# Patient Record
Sex: FEMALE | Race: WHITE | NOT HISPANIC OR LATINO | Employment: FULL TIME | URBAN - METROPOLITAN AREA
[De-identification: names, ages, dates, MRNs, and addresses within clinical notes are randomized per-mention and may not be internally consistent; named-entity substitution may affect disease eponyms.]

---

## 2020-07-17 ENCOUNTER — TRANSFERRED RECORDS (OUTPATIENT)
Dept: HEALTH INFORMATION MANAGEMENT | Facility: CLINIC | Age: 25
End: 2020-07-17

## 2020-08-03 ENCOUNTER — TRANSFERRED RECORDS (OUTPATIENT)
Dept: HEALTH INFORMATION MANAGEMENT | Facility: CLINIC | Age: 25
End: 2020-08-03

## 2020-08-07 ENCOUNTER — TRANSFERRED RECORDS (OUTPATIENT)
Dept: HEALTH INFORMATION MANAGEMENT | Facility: CLINIC | Age: 25
End: 2020-08-07

## 2020-09-30 ENCOUNTER — TRANSFERRED RECORDS (OUTPATIENT)
Dept: HEALTH INFORMATION MANAGEMENT | Facility: CLINIC | Age: 25
End: 2020-09-30

## 2020-10-01 ENCOUNTER — TRANSFERRED RECORDS (OUTPATIENT)
Dept: HEALTH INFORMATION MANAGEMENT | Facility: CLINIC | Age: 25
End: 2020-10-01

## 2020-10-14 ENCOUNTER — TRANSFERRED RECORDS (OUTPATIENT)
Dept: HEALTH INFORMATION MANAGEMENT | Facility: CLINIC | Age: 25
End: 2020-10-14

## 2020-10-14 ENCOUNTER — MEDICAL CORRESPONDENCE (OUTPATIENT)
Dept: HEALTH INFORMATION MANAGEMENT | Facility: CLINIC | Age: 25
End: 2020-10-14

## 2020-10-15 ENCOUNTER — TELEPHONE (OUTPATIENT)
Dept: OBGYN | Facility: CLINIC | Age: 25
End: 2020-10-15

## 2020-10-15 ENCOUNTER — MEDICAL CORRESPONDENCE (OUTPATIENT)
Dept: HEALTH INFORMATION MANAGEMENT | Facility: CLINIC | Age: 25
End: 2020-10-15

## 2020-10-15 ENCOUNTER — TELEPHONE (OUTPATIENT)
Dept: MATERNAL FETAL MEDICINE | Facility: CLINIC | Age: 25
End: 2020-10-15

## 2020-10-15 DIAGNOSIS — Z13.9 SCREENING FOR CONDITION: Primary | ICD-10-CM

## 2020-10-15 RX ORDER — MIFEPRISTONE 200 MG/1
200 TABLET ORAL ONCE
Status: DISCONTINUED | OUTPATIENT
Start: 2020-10-15 | End: 2020-10-18

## 2020-10-15 NOTE — TELEPHONE ENCOUNTER
ANDRESM called as they received a call from [place] for a induction of labor and pt is reported 22 2/7 weeks pregnant    There has been no 24 hour consent obtained    Email sent to Alexandria Mayes to confirm insurance coverage    Discussed with Dr. Machado who will review paperwork, get 24 hour consent and return to nursing

## 2020-10-15 NOTE — TELEPHONE ENCOUNTER
Requesting letter for off work after Induction of Labor.  Letter done and emailed to Leigh.  Noted off work until 10/26/2020.  Pt informed.  Also discussed what time appt tomorrow in clinic and then covid and scheduling for induction on Saturday

## 2020-10-15 NOTE — LETTER
October 15, 2020    Regarding: Leigh Roberts  YOB: 1995  [address] [address]    To Whom It May Concern:     Leigh is under the care of [facility] and is unable to return to work until October 26, 2020     If you have additional questions or concerns, please call us at [phone].    Sincerely,        Justine Gonzalez MD

## 2020-10-15 NOTE — TELEPHONE ENCOUNTER
Spoke with pt and instructed below and called back and left message on voice mail per pt request with     Confirmed induction time/date/location with patient. Informed of clinic appointment date/time/location for mifepristone administration. Informed pt to call birth place one hour prior to scheduled induction [phone]. OK to eat morning of induction.     Pt informed that COVID test will be completed 1-2 hours prior to her induction in the [facility], to arrive around 8 am and then will need to await results     Gestational Age on date of Induction: 22 4/7        CHECKLIST    Medical records received and reviewed by MD: HIEU    Insurance Verified (confirm if completed by [facility], otherwise email [email or web address]): Y    24 hour consent completed by provider or referring clinic, scanned to chart: Y  [id number]/15/[id number]    COVID test ordered/scheduled [facility] 2 hours prior to induction     RN nurse visit for mifepristone day prior to induction: Yes  2 pm 10/16/2020     Scheduled with birthplace - [phone]: Yes    Date/Time Clinic RN Visit: 10/16/2020 2 pm    Date/Time Induction: 10/17/2020 10 am/Covid testing 8 am      Comments:

## 2020-10-15 NOTE — TELEPHONE ENCOUNTER
Pt calling requesting a letter stating she will be admitted to the hospital for IOL on Saturday 10/17/2020 and requesting ideally a week off of work to follow to recover. Message will be passed to Nicolette WINSLOW at [facility] to request letter from supervising MD. Pt stated she wishes letter be sent to [email or web address].

## 2020-10-15 NOTE — TELEPHONE ENCOUNTER
Alexandria Mayes  Thu 10/15/2020 9:53 AM  ?  ?    ?    To: Nicolette Phuong    Hi Nicolette     MRN [id number]  Leigh Roberts     Patient has Blue Plus through [facility]. I spoke with the insurance and a D&E is a covered service, no Auth is needed and no exlusions. Reference # [id number]     Alexandria Mayes

## 2020-10-16 ENCOUNTER — ALLIED HEALTH/NURSE VISIT (OUTPATIENT)
Dept: OBGYN | Facility: CLINIC | Age: 25
End: 2020-10-16
Attending: OBSTETRICS & GYNECOLOGY
Payer: MEDICAID

## 2020-10-16 DIAGNOSIS — Z33.2 TERMINATION OF PREGNANCY (FETUS): Primary | ICD-10-CM

## 2020-10-16 PROCEDURE — 250N000013 HC RX MED GY IP 250 OP 250 PS 637: Performed by: ADVANCED PRACTICE MIDWIFE

## 2020-10-16 PROCEDURE — 99207 PR NO CHARGE NURSE ONLY: CPT

## 2020-10-16 PROCEDURE — 250N000013 HC RX MED GY IP 250 OP 250 PS 637: Performed by: OBSTETRICS & GYNECOLOGY

## 2020-10-16 RX ORDER — MIFEPRISTONE 200 MG/1
200 TABLET ORAL ONCE
Status: DISCONTINUED | OUTPATIENT
Start: 2020-10-16 | End: 2020-10-16

## 2020-10-16 RX ADMIN — Medication 200 MG: at 16:12

## 2020-10-16 RX ADMIN — Medication 200 MG: at 11:51

## 2020-10-16 NOTE — TELEPHONE ENCOUNTER
To: Alexandria Mayes    Just for clarification, this is an induction of labor and not a D&E, does this require any additional clarification with insurance?  Thanks!  Nicolette    Label: DeleteExchangeContent (3 years) Expires: Mon 10/16/2023 10:59 AM  AA  ?  Alexandria Callejas 10/16/2020 10:59 AM  To: Nicolette Hernandez I miss read the email. Yes this would be a covered service as well     Alexandria

## 2020-10-16 NOTE — TELEPHONE ENCOUNTER
Pt called and confirming covid testing will be done in the birth place? Yes.  She is only confirming as she has received a call from scheduling.  Instructed this will be done at the [facility], please disregard reminder for covid testing appt (the appt was cancelled yesterday).

## 2020-10-16 NOTE — NURSING NOTE
Spoke with pt after she met with Tamir Saini CNM.  She was accompanied by sister    She was given directions for 10/17/2020 to present to [facility] at 8 am for covid testing/ to follow with induction of labor    Will await results before moving forward    Many questions and will talk more with nurse tomorrow during induction and involve social work as needed.      Questions answered, pt is aware where Birth Place is located and to eat a light breakfast before coming in. Nicolette Baca RN

## 2020-10-17 ENCOUNTER — ANESTHESIA (OUTPATIENT)
Dept: OBGYN | Facility: CLINIC | Age: 25
End: 2020-10-17
Payer: COMMERCIAL

## 2020-10-17 ENCOUNTER — ANESTHESIA EVENT (OUTPATIENT)
Dept: OBGYN | Facility: CLINIC | Age: 25
End: 2020-10-17
Payer: COMMERCIAL

## 2020-10-17 ENCOUNTER — HOSPITAL ENCOUNTER (INPATIENT)
Facility: CLINIC | Age: 25
LOS: 1 days | Discharge: HOME | End: 2020-10-18
Attending: OBSTETRICS & GYNECOLOGY | Admitting: OBSTETRICS & GYNECOLOGY
Payer: COMMERCIAL

## 2020-10-17 DIAGNOSIS — O04.80 (INDUCED) TERMINATION OF PREGNANCY WITH UNSPECIFIED COMPLICATIONS: Primary | ICD-10-CM

## 2020-10-17 LAB
ABO + RH BLD: NORMAL
ABO + RH BLD: NORMAL
BLD GP AB SCN SERPL QL: NORMAL
BLOOD BANK CMNT PATIENT-IMP: NORMAL
ERYTHROCYTE [DISTWIDTH] IN BLOOD BY AUTOMATED COUNT: 13 % (ref 10–15)
HCT VFR BLD AUTO: 35.4 % (ref 35–47)
HGB BLD-MCNC: 12 G/DL (ref 11.7–15.7)
LABORATORY COMMENT REPORT: NORMAL
MCH RBC QN AUTO: 28.8 PG (ref 26.5–33)
MCHC RBC AUTO-ENTMCNC: 33.9 G/DL (ref 31.5–36.5)
MCV RBC AUTO: 85 FL (ref 78–100)
PLATELET # BLD AUTO: 196 10E9/L (ref 150–450)
RBC # BLD AUTO: 4.17 10E12/L (ref 3.8–5.2)
SARS-COV-2 RNA SPEC QL NAA+PROBE: NEGATIVE
SARS-COV-2 RNA SPEC QL NAA+PROBE: NORMAL
SPECIMEN EXP DATE BLD: NORMAL
SPECIMEN SOURCE: NORMAL
SPECIMEN SOURCE: NORMAL
WBC # BLD AUTO: 7.7 10E9/L (ref 4–11)

## 2020-10-17 PROCEDURE — 88342 IMHCHEM/IMCYTCHM 1ST ANTB: CPT | Mod: 26 | Performed by: PATHOLOGY

## 2020-10-17 PROCEDURE — 120N000002 HC R&B MED SURG/OB UMMC

## 2020-10-17 PROCEDURE — 88342 IMHCHEM/IMCYTCHM 1ST ANTB: CPT | Mod: TC | Performed by: STUDENT IN AN ORGANIZED HEALTH CARE EDUCATION/TRAINING PROGRAM

## 2020-10-17 PROCEDURE — 250N000009 HC RX 250: Performed by: ANESTHESIOLOGY

## 2020-10-17 PROCEDURE — 3E0R3BZ INTRODUCTION OF ANESTHETIC AGENT INTO SPINAL CANAL, PERCUTANEOUS APPROACH: ICD-10-PCS | Performed by: ANESTHESIOLOGY

## 2020-10-17 PROCEDURE — 250N000011 HC RX IP 250 OP 636

## 2020-10-17 PROCEDURE — 88305 TISSUE EXAM BY PATHOLOGIST: CPT | Mod: 26 | Performed by: PATHOLOGY

## 2020-10-17 PROCEDURE — 86900 BLOOD TYPING SEROLOGIC ABO: CPT | Performed by: STUDENT IN AN ORGANIZED HEALTH CARE EDUCATION/TRAINING PROGRAM

## 2020-10-17 PROCEDURE — U0003 INFECTIOUS AGENT DETECTION BY NUCLEIC ACID (DNA OR RNA); SEVERE ACUTE RESPIRATORY SYNDROME CORONAVIRUS 2 (SARS-COV-2) (CORONAVIRUS DISEASE [COVID-19]), AMPLIFIED PROBE TECHNIQUE, MAKING USE OF HIGH THROUGHPUT TECHNOLOGIES AS DESCRIBED BY CMS-2020-01-R: HCPCS | Performed by: STUDENT IN AN ORGANIZED HEALTH CARE EDUCATION/TRAINING PROGRAM

## 2020-10-17 PROCEDURE — 250N000011 HC RX IP 250 OP 636: Performed by: ANESTHESIOLOGY

## 2020-10-17 PROCEDURE — 86850 RBC ANTIBODY SCREEN: CPT | Performed by: STUDENT IN AN ORGANIZED HEALTH CARE EDUCATION/TRAINING PROGRAM

## 2020-10-17 PROCEDURE — 250N000013 HC RX MED GY IP 250 OP 250 PS 637: Performed by: STUDENT IN AN ORGANIZED HEALTH CARE EDUCATION/TRAINING PROGRAM

## 2020-10-17 PROCEDURE — 36415 COLL VENOUS BLD VENIPUNCTURE: CPT | Performed by: STUDENT IN AN ORGANIZED HEALTH CARE EDUCATION/TRAINING PROGRAM

## 2020-10-17 PROCEDURE — 250N000011 HC RX IP 250 OP 636: Performed by: STUDENT IN AN ORGANIZED HEALTH CARE EDUCATION/TRAINING PROGRAM

## 2020-10-17 PROCEDURE — C9803 HOPD COVID-19 SPEC COLLECT: HCPCS

## 2020-10-17 PROCEDURE — 85027 COMPLETE CBC AUTOMATED: CPT | Performed by: STUDENT IN AN ORGANIZED HEALTH CARE EDUCATION/TRAINING PROGRAM

## 2020-10-17 PROCEDURE — 258N000003 HC RX IP 258 OP 636

## 2020-10-17 PROCEDURE — 86780 TREPONEMA PALLIDUM: CPT | Performed by: STUDENT IN AN ORGANIZED HEALTH CARE EDUCATION/TRAINING PROGRAM

## 2020-10-17 PROCEDURE — 10907ZC DRAINAGE OF AMNIOTIC FLUID, THERAPEUTIC FROM PRODUCTS OF CONCEPTION, VIA NATURAL OR ARTIFICIAL OPENING: ICD-10-PCS | Performed by: OBSTETRICS & GYNECOLOGY

## 2020-10-17 PROCEDURE — 88305 TISSUE EXAM BY PATHOLOGIST: CPT | Mod: TC | Performed by: STUDENT IN AN ORGANIZED HEALTH CARE EDUCATION/TRAINING PROGRAM

## 2020-10-17 PROCEDURE — 59409 OBSTETRICAL CARE: CPT | Mod: GC | Performed by: OBSTETRICS & GYNECOLOGY

## 2020-10-17 PROCEDURE — 86901 BLOOD TYPING SEROLOGIC RH(D): CPT | Performed by: STUDENT IN AN ORGANIZED HEALTH CARE EDUCATION/TRAINING PROGRAM

## 2020-10-17 PROCEDURE — 00HU33Z INSERTION OF INFUSION DEVICE INTO SPINAL CANAL, PERCUTANEOUS APPROACH: ICD-10-PCS | Performed by: ANESTHESIOLOGY

## 2020-10-17 PROCEDURE — 3E0P7VZ INTRODUCTION OF HORMONE INTO FEMALE REPRODUCTIVE, VIA NATURAL OR ARTIFICIAL OPENING: ICD-10-PCS | Performed by: OBSTETRICS & GYNECOLOGY

## 2020-10-17 PROCEDURE — 722N000001 HC LABOR CARE VAGINAL DELIVERY SINGLE

## 2020-10-17 RX ORDER — ONDANSETRON 2 MG/ML
4 INJECTION INTRAMUSCULAR; INTRAVENOUS EVERY 6 HOURS PRN
Status: DISCONTINUED | OUTPATIENT
Start: 2020-10-17 | End: 2020-10-17

## 2020-10-17 RX ORDER — MISOPROSTOL 200 UG/1
400 TABLET ORAL EVERY 4 HOURS PRN
Status: DISCONTINUED | OUTPATIENT
Start: 2020-10-17 | End: 2020-10-18

## 2020-10-17 RX ORDER — METHYLERGONOVINE MALEATE 0.2 MG/ML
INJECTION INTRAVENOUS
Status: COMPLETED
Start: 2020-10-17 | End: 2020-10-17

## 2020-10-17 RX ORDER — ONDANSETRON 4 MG/1
4 TABLET, ORALLY DISINTEGRATING ORAL EVERY 6 HOURS PRN
Status: DISCONTINUED | OUTPATIENT
Start: 2020-10-17 | End: 2020-10-18

## 2020-10-17 RX ORDER — NALOXONE HYDROCHLORIDE 0.4 MG/ML
.1-.4 INJECTION, SOLUTION INTRAMUSCULAR; INTRAVENOUS; SUBCUTANEOUS
Status: DISCONTINUED | OUTPATIENT
Start: 2020-10-17 | End: 2020-10-17

## 2020-10-17 RX ORDER — FENTANYL CITRATE 50 UG/ML
INJECTION, SOLUTION INTRAMUSCULAR; INTRAVENOUS PRN
Status: DISCONTINUED | OUTPATIENT
Start: 2020-10-17 | End: 2020-10-28 | Stop reason: HOSPADM

## 2020-10-17 RX ORDER — MISOPROSTOL 200 UG/1
600 TABLET ORAL ONCE
Status: COMPLETED | OUTPATIENT
Start: 2020-10-17 | End: 2020-10-17

## 2020-10-17 RX ORDER — OXYTOCIN/0.9 % SODIUM CHLORIDE 30/500 ML
PLASTIC BAG, INJECTION (ML) INTRAVENOUS
Status: DISPENSED
Start: 2020-10-17 | End: 2020-10-18

## 2020-10-17 RX ORDER — ACETAMINOPHEN 325 MG/1
650 TABLET ORAL EVERY 4 HOURS PRN
Status: DISCONTINUED | OUTPATIENT
Start: 2020-10-17 | End: 2020-10-18

## 2020-10-17 RX ORDER — OXYTOCIN 10 [USP'U]/ML
INJECTION, SOLUTION INTRAMUSCULAR; INTRAVENOUS
Status: DISCONTINUED
Start: 2020-10-17 | End: 2020-10-17 | Stop reason: WASHOUT

## 2020-10-17 RX ORDER — EPHEDRINE SULFATE 50 MG/ML
INJECTION, SOLUTION INTRAMUSCULAR; INTRAVENOUS; SUBCUTANEOUS
Status: DISCONTINUED
Start: 2020-10-17 | End: 2020-10-17 | Stop reason: WASHOUT

## 2020-10-17 RX ORDER — DIPHENOXYLATE HCL/ATROPINE 2.5-.025MG
2 TABLET ORAL ONCE
Status: COMPLETED | OUTPATIENT
Start: 2020-10-17 | End: 2020-10-17

## 2020-10-17 RX ORDER — FENTANYL/BUPIVACAINE/NS/PF 2-1250MCG
PLASTIC BAG, INJECTION (ML) INJECTION
Status: COMPLETED
Start: 2020-10-17 | End: 2020-10-17

## 2020-10-17 RX ORDER — DIPHENOXYLATE HCL/ATROPINE 2.5-.025MG
2 TABLET ORAL EVERY 4 HOURS PRN
Status: DISCONTINUED | OUTPATIENT
Start: 2020-10-17 | End: 2020-10-18

## 2020-10-17 RX ORDER — ONDANSETRON 2 MG/ML
4 INJECTION INTRAMUSCULAR; INTRAVENOUS EVERY 6 HOURS PRN
Status: DISCONTINUED | OUTPATIENT
Start: 2020-10-17 | End: 2020-10-18

## 2020-10-17 RX ORDER — LIDOCAINE HYDROCHLORIDE AND EPINEPHRINE 15; 5 MG/ML; UG/ML
3 INJECTION, SOLUTION EPIDURAL
Status: DISCONTINUED | OUTPATIENT
Start: 2020-10-17 | End: 2020-10-18

## 2020-10-17 RX ORDER — SODIUM CHLORIDE, SODIUM LACTATE, POTASSIUM CHLORIDE, CALCIUM CHLORIDE 600; 310; 30; 20 MG/100ML; MG/100ML; MG/100ML; MG/100ML
INJECTION, SOLUTION INTRAVENOUS
Status: COMPLETED
Start: 2020-10-17 | End: 2020-10-17

## 2020-10-17 RX ORDER — FENTANYL CITRATE 50 UG/ML
INJECTION, SOLUTION INTRAMUSCULAR; INTRAVENOUS
Status: COMPLETED
Start: 2020-10-17 | End: 2020-10-17

## 2020-10-17 RX ORDER — LIDOCAINE HYDROCHLORIDE 10 MG/ML
INJECTION, SOLUTION EPIDURAL; INFILTRATION; INTRACAUDAL; PERINEURAL
Status: DISCONTINUED
Start: 2020-10-17 | End: 2020-10-17 | Stop reason: WASHOUT

## 2020-10-17 RX ORDER — EPHEDRINE SULFATE 50 MG/ML
5 INJECTION, SOLUTION INTRAMUSCULAR; INTRAVENOUS; SUBCUTANEOUS
Status: DISCONTINUED | OUTPATIENT
Start: 2020-10-17 | End: 2020-10-18

## 2020-10-17 RX ORDER — HYDROMORPHONE HYDROCHLORIDE 1 MG/ML
0.2 INJECTION, SOLUTION INTRAMUSCULAR; INTRAVENOUS; SUBCUTANEOUS
Status: DISCONTINUED | OUTPATIENT
Start: 2020-10-17 | End: 2020-10-18

## 2020-10-17 RX ORDER — NALOXONE HYDROCHLORIDE 0.4 MG/ML
.1-.4 INJECTION, SOLUTION INTRAMUSCULAR; INTRAVENOUS; SUBCUTANEOUS
Status: DISCONTINUED | OUTPATIENT
Start: 2020-10-17 | End: 2020-10-18

## 2020-10-17 RX ORDER — LIDOCAINE 40 MG/G
CREAM TOPICAL
Status: DISCONTINUED | OUTPATIENT
Start: 2020-10-17 | End: 2020-10-18

## 2020-10-17 RX ORDER — MISOPROSTOL 200 UG/1
TABLET ORAL
Status: DISPENSED
Start: 2020-10-17 | End: 2020-10-18

## 2020-10-17 RX ADMIN — FENTANYL CITRATE 2 MCG: 50 INJECTION, SOLUTION INTRAMUSCULAR; INTRAVENOUS at 19:00

## 2020-10-17 RX ADMIN — Medication 0.5 ML: at 20:43

## 2020-10-17 RX ADMIN — DIPHENOXYLATE HYDROCHLORIDE AND ATROPINE SULFATE 2 TABLET: 2.5; .025 TABLET ORAL at 12:26

## 2020-10-17 RX ADMIN — FENTANYL CITRATE 1 MCG: 50 INJECTION, SOLUTION INTRAMUSCULAR; INTRAVENOUS at 20:43

## 2020-10-17 RX ADMIN — ONDANSETRON 4 MG: 2 INJECTION INTRAMUSCULAR; INTRAVENOUS at 22:06

## 2020-10-17 RX ADMIN — SODIUM CHLORIDE, POTASSIUM CHLORIDE, SODIUM LACTATE AND CALCIUM CHLORIDE 1000 ML: 600; 310; 30; 20 INJECTION, SOLUTION INTRAVENOUS at 16:50

## 2020-10-17 RX ADMIN — DIPHENOXYLATE HYDROCHLORIDE AND ATROPINE SULFATE 2 TABLET: 2.5; .025 TABLET ORAL at 18:12

## 2020-10-17 RX ADMIN — ONDANSETRON 4 MG: 2 INJECTION INTRAMUSCULAR; INTRAVENOUS at 16:18

## 2020-10-17 RX ADMIN — METHYLERGONOVINE MALEATE 200 MCG: 0.2 INJECTION INTRAMUSCULAR; INTRAVENOUS at 21:40

## 2020-10-17 RX ADMIN — Medication 1 ML/HR: at 17:59

## 2020-10-17 RX ADMIN — FENTANYL CITRATE 25 MCG: 50 INJECTION, SOLUTION INTRAMUSCULAR; INTRAVENOUS at 17:24

## 2020-10-17 RX ADMIN — HYDROMORPHONE HYDROCHLORIDE 0.2 MG: 1 INJECTION, SOLUTION INTRAMUSCULAR; INTRAVENOUS; SUBCUTANEOUS at 14:36

## 2020-10-17 RX ADMIN — MISOPROSTOL 600 MCG: 200 TABLET ORAL at 12:19

## 2020-10-17 RX ADMIN — MISOPROSTOL 400 MCG: 200 TABLET ORAL at 18:30

## 2020-10-17 RX ADMIN — FENTANYL CITRATE 1.4 MCG: 50 INJECTION, SOLUTION INTRAMUSCULAR; INTRAVENOUS at 20:58

## 2020-10-17 RX ADMIN — Medication 1 ML/HR: at 17:50

## 2020-10-17 RX ADMIN — Medication 1.7 ML/HR: at 20:46

## 2020-10-17 RX ADMIN — Medication 0.7 ML: at 20:58

## 2020-10-17 RX ADMIN — Medication 1 ML: at 19:00

## 2020-10-17 ASSESSMENT — MIFFLIN-ST. JEOR: SCORE: 1497.24

## 2020-10-17 NOTE — PROGRESS NOTES
SPIRITUAL HEALTH SERVICES  [facility] ([facility] 4 COB  ON-CALL VISIT     REFERRAL SOURCE: I did visit this morning patient Leigh per epic consult order. I introduced myself as the on-call  and shared all the info about the SH[facility].     Pt have been informed about her baby's life before she born and accepted the reality very badly. However, at this moment pt current request is, when her baby born to get emergency Voodoo. I tried to comfort the pt and shared some up lifting word from the Bible. I also informed her that the night on-call  to be aware of it.     PLAN: I will inform the night on-call  to be ready for emergency Voodoo and for any spiritual and emotional support.     Osmel Shah M.Div (Alem)., M.Th., D.Min., [facility]  Staff   Pager [phone]

## 2020-10-17 NOTE — PLAN OF CARE
Difficult to monitor blood pressures following spinal due to patient shaking, MDA at bedside and aware.

## 2020-10-17 NOTE — PROGRESS NOTES
[facility]  Labor Progress Note    S: Patient sitting up at side of bed vomiting. Feeling strong period cramps. Desires epidural.     O:   Patient Vitals for the past 4 hrs:   BP Temp Temp src Resp SpO2   10/17/20 1731 131/66 -- -- 16 100 %   10/17/20 1729 126/67 -- -- 18 --   10/17/20 1727 128/68 -- -- 20 --   10/17/20 1724 137/78 -- -- 16 100 %   10/17/20 1704 121/64 -- -- 20 99 %   10/17/20 1525 134/68 98.2  F (36.8  C) Oral 18 --   10/17/20 1439 -- 97.9  F (36.6  C) Oral -- --     SVE: FT/long/high      A/P:  Ms. Leigh Roberts is a 25 year old  at 22w4d here for induction termination due to fetal anomalies secondary to primary CMV infection.    IOL:  - S/p 600 miso x1. Will plan to continue with 400 miso q4h  - Desires epidural for pain control  - S/p consult with    - SW consult in place    Christine Toney MD  OB/GYN Resident, PGY-3  10/17/2020 5:54 PM

## 2020-10-17 NOTE — LETTER
October 18, 2020        Leigh Muhammadkvngholly  [address]   [place] [address]    To Whom it May Concern:    Leigh Roberts was admitted 10/17/20-10/18/20 and was given the following instructions:  Patient may return to work on 11/16/20.    Sincerely,    Justine Gonzalez MD

## 2020-10-17 NOTE — H&P
[facility]  OB History and Physical      Leigh Roberts MRN# [id number]   Age: 25 year old YOB: 1995       HPI:  Ms. Leigh Roberts is a 25 year old  at 22w4d who presents for scheduled induction termination due to fetal anomalies secondary to primary CMV infection. These anomalies are not compatible with life. She is doing ok today. Is interested in holding her baby after birth, desires Anglican if possible and footprints. She desires an epidural for pain control. She does not want autopsy or genetics for her baby since she already has a diagnosis.     Pregnancy Complications:  - Multiple fetal anomalies secondary to primary maternal CMV infection, confirmed via amniocentesis  - History of meth use, sober since 2018    Prenatal Labs:   Lab Results   Component Value Date    ABO O 10/17/2020    RH Pos 10/17/2020    AS Neg 10/17/2020    HGB 12.0 10/17/2020     Ultrasounds  US 10/14/2020   US OB FOLLOW UP   1. Biometric measurements consistent with 21 weeks 4 days   gestation, confirms OMER of 2021   2. Echogenic bowel   3. Right sided brain bleed suspected   4. Pericardial effusion   5. Abdominal ascites   6. Ventriculomegaly   7. Decreased amniotic fluid volume   8. Normal posterior placenta   9. No adnexal masses noted    OB History  OB History    Para Term  AB Living   1 0 0 0 0 0   SAB TAB Ectopic Multiple Live Births   0 0 0 0 0      # Outcome Date GA Lbr Jose Luis/2nd Weight Sex Delivery Anes PTL Lv   1 Current                PMHx: History of drug use  PSHx: None     Meds:   Facility-Administered Medications Prior to Admission   Medication Dose Route Frequency Provider Last Rate Last Dose     [COMPLETED] miFEPRIStone (MIFEPREX) tablet 200 mg  200 mg Oral Once Tamir Saini APRN CNM   200 mg at 10/16/20 1612     miFEPRIStone (MIFEPREX) tablet 200 mg  200 mg Oral Once Justine Gonzalez MD         No medications prior to admission.  "    Allergies:    Allergies   Allergen Reactions     Bactrim [Sulfamethoxazole W/Trimethoprim]       FmHx: No family history on file.  SocHx: History of methamphetamine use, sober since 2018    ROS:   Complete 10-point ROS negative except as noted in HPI. She denies headache, blurry vision, chest pain, shortness of breath, RUQ pain, nausea, vomiting, dysuria, hematuria or extremity edema.    PE:  Vit:   Patient Vitals for the past 4 hrs:   Temp Temp src Resp Height Weight   10/17/20 1124 97.7  F (36.5  C) Oral 16 1.765 m (5' 9.5\") 68.5 kg (151 lb)      Gen: Well-appearing, NAD, comfortable   CV: rrr, no mrg   Pulm: Ctab, no wheezes or crackles   Abd: Soft, gravid, non-tender  Ext: trace LE edema b/l  Cx: deferred           Assessment  Ms. Leigh Roberts is a 25 year old  at 22w4d, who presents for induction termination due to fetal anomalies secondary to primary CMV infection.     Plan  Admit to L&D.    Induction of labor   - Labs: CBC, T&S, COVID  - Induction of labor plan: S/p PO Mifepristone 200 mg. PO Misoprostol 600 micrograms now, followed by  micrograms every 4 hours  - Pain Control: discussed options. Patient is interested in epidural.  - Diet: Regular in early labor. Clear liquid diet once or in active labor  - PPH Meds: all are available.  - Patient declines fetal autopsy and genetics as she already had testing through amniocentesis  - Would like to talk with  regarding disposition of fetal remains     The patient was discussed with Dr. Machado who is in agreement with the treatment plan.    Christine Toney MD  OBGYN PGY-3  12:13 PM 10/17/2020    Staff MD Note    I appreciate the note by Dr. Toney.  Any necessary changes have been made by me.  I saw and evaluated the patient and agree with the findings and plan of care as documented in the note.    Jackie Machado MD    "

## 2020-10-17 NOTE — ANESTHESIA PROCEDURE NOTES
Epidural Procedure Note      Staff -   Anesthesiologist:  Riddhi Ordoñez MD  Performed By: anesthesiologist    Location: OB     Procedure start time:  10/17/2020 5:10 PM     Procedure end time:  10/17/2020 5:24 PM   Pre-procedure checklist:   patient identified, IV checked, site marked, risks and benefits discussed, informed consent, monitors and equipment checked and pre-op evaluation      Correct Patient: Yes    Procedure:     Procedure:  Intrathecal    ASA:  2 and Emergent    Diagnosis:  Labor pain    Position:  Sitting    Sterile Prep: chloraprep, alcohol swabs, mask, sterile gloves and patient draped      Insertion site:  L4-5    Local skin infiltration:  1% lidocaine    amount (mL):  5    Approach:  Midline    Needle gauge (G):  17    Needle Length (in):  3.5    Block Needle Type:  Touhy    Injection Technique:  LORT saline    JOSE ANGEL at (cm):  8    Attempts:  1    Redirects:  1    Catheter gauge (G):  19    Catheter threaded easily: Yes      Threaded to cm at skin:  12    Threaded in epidural space (cm):  4 (intrathecal space, not epidural)    Paresthesias:  Resolved (R side, resolved)    Aspiration negative for Heme or CSF: No    Assessment/Narrative:      Initial pass osseous only, angle directed caudad, + CSF with Tuohy needle, catheter inserted. Initial bolus intrathecal with bupivacaine 0.125% 2 ml + fentanyl 25 mcg. Pt. With good pain relief.Instructions given to not let anyone other than myself or Dr. Ren touch catheter. Instructed to keep hydrated and have caffeinated beverages after delivery.

## 2020-10-17 NOTE — ANESTHESIA PREPROCEDURE EVALUATION
"Anesthesia Pre-Procedure Evaluation    Patient: Leigh Roberts   MRN:     [id number] Gender:   female   Age:    25 year old :      1995        Preoperative Diagnosis: * No surgery found *        LABS:  CBC:   Lab Results   Component Value Date    WBC 7.7 10/17/2020    HGB 12.0 10/17/2020    HCT 35.4 10/17/2020     10/17/2020     BMP: No results found for: NA, POTASSIUM, CHLORIDE, CO2, BUN, CR, GLC  COAGS: No results found for: PTT, INR, FIBR  POC: No results found for: BGM, HCG, HCGS  OTHER: No results found for: PH, LACT, A1C, NILSON, PHOS, MAG, ALBUMIN, PROTTOTAL, ALT, AST, GGT, ALKPHOS, BILITOTAL, BILIDIRECT, LIPASE, AMYLASE, YOSI, TSH, T4, T3, CRP, SED     Preop Vitals    BP Readings from Last 3 Encounters:   10/17/20 134/68    Pulse Readings from Last 3 Encounters:   No data found for Pulse      Resp Readings from Last 3 Encounters:   10/17/20 18    SpO2 Readings from Last 3 Encounters:   No data found for SpO2      Temp Readings from Last 1 Encounters:   10/17/20 36.8  C (98.2  F) (Oral)    Ht Readings from Last 1 Encounters:   10/17/20 1.765 m (5' 9.5\")      Wt Readings from Last 1 Encounters:   10/17/20 68.5 kg (151 lb)    Estimated body mass index is 21.98 kg/m  as calculated from the following:    Height as of this encounter: 1.765 m (5' 9.5\").    Weight as of this encounter: 68.5 kg (151 lb).     LDA:  Peripheral IV 10/17/20 Anterior;Left Hand (Active)   Site Assessment WDL 10/17/20 1525   Line Status Saline locked 10/17/20 1525   Phlebitis Scale 0-->no symptoms 10/17/20 1525   Number of days: 0        No past medical history on file.   No past surgical history on file.   Allergies   Allergen Reactions     Bactrim [Sulfamethoxazole W/Trimethoprim]         Anesthesia Evaluation       history and physical reviewed .             ROS/MED HX    ENT/Pulmonary:  - neg pulmonary ROS     Neurologic:  - neg neurologic ROS     Cardiovascular:  - neg cardiovascular ROS       METS/Exercise Tolerance:  "    Hematologic:         Musculoskeletal:         GI/Hepatic:  - neg GI/hepatic ROS       Renal/Genitourinary:         Endo:         Psychiatric:         Infectious Disease:         Malignancy:         Other:                     JZG FV AN PHYSICAL EXAM    Assessment:   ASA SCORE: 2 emergent   H&P: History and physical reviewed and following examination; no interval change.   Smoking Status:  Non-Smoker/Unknown   NPO Status: ELEVATED Aspiration Risk/Unknown     Plan:   Anes. Type:  Epidural     Epidural Details:  Catheter; Lumbar   Pre-Medication: None   Induction:  N/a   Airway: Native Airway   Access/Monitoring: PIV   Maintenance: N/a     Postop Plan:   Postop Pain: Regional  Postop Sedation/Airway: Not planned  Disposition: Inpatient/Admit     PONV Management:   Adult Risk Factors: Female, Non-Smoker             neg OB ROS             Riddhi Ordoñez MD

## 2020-10-18 VITALS
OXYGEN SATURATION: 98 % | RESPIRATION RATE: 16 BRPM | HEIGHT: 70 IN | WEIGHT: 151 LBS | DIASTOLIC BLOOD PRESSURE: 63 MMHG | SYSTOLIC BLOOD PRESSURE: 109 MMHG | TEMPERATURE: 97.7 F | BODY MASS INDEX: 21.62 KG/M2

## 2020-10-18 LAB — T PALLIDUM AB SER QL: NONREACTIVE

## 2020-10-18 PROCEDURE — 250N000013 HC RX MED GY IP 250 OP 250 PS 637: Performed by: STUDENT IN AN ORGANIZED HEALTH CARE EDUCATION/TRAINING PROGRAM

## 2020-10-18 PROCEDURE — 99238 HOSP IP/OBS DSCHRG MGMT 30/<: CPT | Mod: GC | Performed by: OBSTETRICS & GYNECOLOGY

## 2020-10-18 RX ORDER — IBUPROFEN 200 MG
600 TABLET ORAL EVERY 6 HOURS PRN
Status: DISCONTINUED | OUTPATIENT
Start: 2020-10-18 | End: 2020-10-18 | Stop reason: HOSPADM

## 2020-10-18 RX ORDER — AMOXICILLIN 250 MG
1 CAPSULE ORAL DAILY
Qty: 100 TABLET | Refills: 0 | Status: SHIPPED | OUTPATIENT
Start: 2020-10-18 | End: 2021-05-11

## 2020-10-18 RX ORDER — ACETAMINOPHEN 325 MG/1
650 TABLET ORAL EVERY 6 HOURS PRN
Qty: 100 TABLET | Refills: 0 | Status: SHIPPED | OUTPATIENT
Start: 2020-10-18 | End: 2022-04-05

## 2020-10-18 RX ORDER — IBUPROFEN 600 MG/1
600 TABLET, FILM COATED ORAL EVERY 6 HOURS PRN
Qty: 30 TABLET | Refills: 0 | Status: SHIPPED | OUTPATIENT
Start: 2020-10-18 | End: 2022-04-05

## 2020-10-18 RX ORDER — ACETAMINOPHEN 325 MG/1
650 TABLET ORAL EVERY 4 HOURS PRN
Status: DISCONTINUED | OUTPATIENT
Start: 2020-10-18 | End: 2020-10-18 | Stop reason: HOSPADM

## 2020-10-18 RX ADMIN — ACETAMINOPHEN 650 MG: 325 TABLET, FILM COATED ORAL at 13:41

## 2020-10-18 RX ADMIN — IBUPROFEN 600 MG: 200 TABLET, FILM COATED ORAL at 15:39

## 2020-10-18 RX ADMIN — ACETAMINOPHEN 650 MG: 325 TABLET, FILM COATED ORAL at 01:50

## 2020-10-18 RX ADMIN — IBUPROFEN 600 MG: 200 TABLET, FILM COATED ORAL at 08:57

## 2020-10-18 ASSESSMENT — ACTIVITIES OF DAILY LIVING (ADL)
CONCENTRATING,_REMEMBERING_OR_MAKING_DECISIONS_DIFFICULTY: NO
DIFFICULTY_COMMUNICATING: NO
DRESSING/BATHING_DIFFICULTY: NO
DOING_ERRANDS_INDEPENDENTLY_DIFFICULTY: NO
DIFFICULTY_EATING/SWALLOWING: NO
TOILETING_ISSUES: NO
WALKING_OR_CLIMBING_STAIRS_DIFFICULTY: NO
WEAR_GLASSES_OR_BLIND: NO
FALL_HISTORY_WITHIN_LAST_SIX_MONTHS: NO

## 2020-10-18 NOTE — PLAN OF CARE
Bess was discharged to home at 1630 via wheelchair with her baby in her arms. Bess will be taking her baby to the [facility] for cremation this evening. Reviewed with Catalina Valdez, the on call , the certification of removal that was sent from [facility]. Certificate filled out and placed in pt's paperwork.    Discharge instructions were reviewed with Bess. Medications given. Bess doesn't have any questions or concerns at this time, but will call her provider if concerns arise.

## 2020-10-18 NOTE — PROGRESS NOTES
of demised male fetus. Perineum intact. Maternal VSS. Bleeding stable, fundus firm and at the umbilicus.

## 2020-10-18 NOTE — PROGRESS NOTES
"D: Social Work On Call    I: Writer met with \"Bess\" and her father at bedside. Bess would like to be able to take her baby back to [place], [place] with her to  home for cremation. She had spoken to  Home and provided writer with name and number of - Xu at [phone]. The  home is not able to come this far to  baby. Bess asking if she and her family could transport baby themselves.    Per protocol, writer contacted Risk Management and spoke with Sumi Bailey ([phone]). Sumi stated that if the  home could provide the hospital with a Certificate of Removal then given that they are not able to pick baby up due to distance, the family can transport baby themselves.     Bess states she is doing better. She was clear about her decision by the time this writer arrived. She had discussed her options with her family and writer reviewed them. She was clear that this is what she would like to do. She denies any suicidal ideation and states she has a strong support system at home.     A/P: Provided Bess with the book [employer] Cradle [employer] as well as other resources including the information on one time tax credit of 2,000$ to help cover expenses. Currently waiting for [facility] to fax or e-mail the Certificate of Removal. Bess and her family will then transport baby to  home in [place].   "

## 2020-10-18 NOTE — PLAN OF CARE
VSS. Pt slept overnight with baby in room. Bleeding stable. Pt medicated overnight with Tylenol for pain. Pt emotional, but seems to be coping appropriately with loss of infant. Support person, Florencio (stepfather) at bedside. Plan is for pt to be discharged today. Will continue with plan of care.

## 2020-10-18 NOTE — DISCHARGE SUMMARY
[facility] Discharge Summary    Leigh Roberts MRN# [id number]   Age: 25 year old YOB: 1995     Date of Admission:  10/17/2020  Date of Discharge:  10/18/20   Admitting Physician:  Jackie Machado MD  Discharge Physician:  JIN PALM MD    Admit Dx:   - Intrauterine pregnancy at 22w4d  - Multiple lethal fetal anomalies secondary to primary maternal CMV infection, confirmed via amniocentesis  - History of meth use, sober since 2018    Discharge Dx:  - Same as above, s/p     Procedures:  - Spontaneous vaginal delivery  - Epidural analgesia    Admit HPI/Labor Course:  Leigh Roberts is a 25 year old  at 22w4d who presented to L&D for induction termination for fetal anomalies secondary to primary CMV infection.     Patient's SVE on admission was FT/long/high, misoprostol was used for induction. She received an epidural catheter for pain management. The patient progressed to complete and pushed for approximately 2 minutes. She subsequently delivered a male infant encaul at  on 10/17/2020. Apgars were 0 and 0 at 1 and 5 minutes. The placenta then delivered intact. IV pitocin was started for active management of the third stage. Due to some blood clots expressed with fundal massage, the patient was also given a dose of IM methergine. Uterine tone was then firm. There were no lacerations. Upon final inspection, there was good hemostasis. Instrument and sharp count was correct. EBL: 200 mL  Please see her Admission H&P and Delivery Summary for further details.    Postpartum Course:  Her postpartum course was uncomplicated. On PPD#1, she was meeting all of her postpartum goals and deemed stable for discharge. She was voiding without difficulty, tolerating a regular diet without nausea and vomiting, her pain was well controlled on oral pain medicines and her lochia was appropriate. Her hemoglobin prior to delivery was 12.0. Her Rh status was positive, and  Rhogam was not indicated.     Discharge Medications:     Review of your medicines      START taking      Dose / Directions   acetaminophen 325 MG tablet  Commonly known as: TYLENOL      Dose: 650 mg  Take 2 tablets (650 mg) by mouth every 6 hours as needed for mild pain Start after Delivery.  Quantity: 100 tablet  Refills: 0     ibuprofen 600 MG tablet  Commonly known as: ADVIL/MOTRIN      Dose: 600 mg  Take 1 tablet (600 mg) by mouth every 6 hours as needed for moderate pain  Quantity: 30 tablet  Refills: 0     senna-docusate 8.6-50 MG tablet  Commonly known as: SENOKOT-S/PERICOLACE      Dose: 1 tablet  Take 1 tablet by mouth daily Start after delivery.  Quantity: 100 tablet  Refills: 0           Where to get your medicines      These medications were sent to [facility] [address]  [address] [address]    Phone: [phone]     acetaminophen 325 MG tablet    ibuprofen 600 MG tablet    senna-docusate 8.6-50 MG tablet       Discharge/Disposition:  Leigh Roberts was discharged to home in stable condition with the following instructions/medications:  1) Call for temperature > 100.4, bright red vaginal bleeding >1 pad an hour x 2 hours, foul smelling vaginal discharge, pain not controlled by usual oral pain meds, persistent nausea and vomiting not controlled on medications  2) She was instructed to follow-up with her primary OB if she has any signs of infections or in 6 weeks for contraception management.    Anabel Little MD ([email or web address])  N OBGYN PGY-3  OB G2 pager: [phone]  OB G3 pager: [phone]  10/18/2020       I have seen, examined, and counseled the patient on the day of discharge. I have reviewed and edited the summary.  Justine Gonzalez

## 2020-10-18 NOTE — PROGRESS NOTES
SPIRITUAL HEALTH SERVICES  SPIRITUAL ASSESSMENT Progress Note  [facility] (4th Floor)   ON-CALL VISIT    REFERRAL SOURCE: Nurse paged for Church.     I met with Bess and her sister Myra following the delivery of her  baby Matthew. We prayed together and baptized the baby.    PLAN: No follow up anticipated as Bess stated she would be discharged the next day.     Rachel Bailey  Chaplain Resident  Pager: [phone]

## 2020-10-18 NOTE — PLAN OF CARE
Data: Vital signs within normal limits. Postpartum checks within normal limits - see flow record. Patient eating and drinking normally. Patient able to empty bladder independently and is up ambulating. No apparent signs of infection.  Perineum  healing well. Patient performing self cares and grieving appropriately due to stillbirth.  Action: Patient medicated during the shift for pain and cramping. See MAR. Patient reassessed within 1 hour after each medication and pain was improved - patient stated she was comfortable. Patient education done about discharge teaching, follow up plan. See flow record.  Response:  Support persons are present.   Plan: Anticipate discharge on today.

## 2020-10-18 NOTE — PROGRESS NOTES
[facility] Obstetrics Post-Partum Progress Note          Assessment and Plan:    Assessment:   Post-partum day #1  Normal spontaneous vaginal delivery  L&D complications: IOL termination at 22+4 for CMV      Doing well.      Plan:   Ambulation encouraged  Discharge later today           Interval History:   Doing well.  Pain is well-controlled.  No fevers.  No history of foul-smelling vaginal discharge.  Good appetite.         Significant Problems:    loss of 22 week pregnancy          Review of Systems:    The patient denies any chest pain, shortness of breath, excessive pain, fever, chills, purulent drainage from the wound, nausea or vomiting.          Medications:   All medications related to the patient's surgery have been reviewed          Physical Exam:   All vitals stable  Temp: 97.7  F (36.5  C) Temp src: Oral BP: 109/63     Resp: 16 SpO2: 98 %      Uterine fundus is firm, non-tender and small          Data:     Hemoglobin   Date Value Ref Range Status   10/17/2020 12.0 11.7 - 15.7 g/dL Final     No imaging studies have been ordered    Justine Gonzalez MD

## 2020-10-18 NOTE — PROGRESS NOTES
Pt. s/p vaginal delivery yesterday evening with with intrathecal catheter for analgesia. Pt has no complaints at this time. Neuro exam unremarkable, sensation and motor strength intact. No complaints of HA. Site of catheter insertion clean and healing well. Pt instructed to stay hydrated, consume caffeinated beverages, and relative bedrest for 48 hours. Pt. also instructed to go to ED if positional HA occurs and tell the ED physician that pt had spinal catheter for labor, as patient lives 3 hours away so ED would not be here.Pt has no further questions at this time and is satisfied with her labor analgesia.

## 2020-10-18 NOTE — L&D DELIVERY NOTE
Delivery Note:  Leigh Roberts is a 25 year old  at 22w4d who presented to L&D for induction termination for fetal anomalies secondary to primary CMV infection.    Patient's SVE on admission was FT/long/high, misoprostol was used for induction. She received an epidural catheter for pain management. The patient progressed to complete and pushed for approximately 2 minutes. She subsequently delivered a male infant encaul at 2122 on 10/17/2020. Apgars were 0 and 0 at 1 and 5 minutes. The placenta then delivered intact. IV pitocin was started for active management of the third stage. Due to some blood clots expressed with fundal massage, the patient was also given a dose of IM methergine. Uterine tone was then firm. There were no lacerations. Upon final inspection, there was good hemostasis. Instrument and sharp count was correct. EBL: 200 mL    Dr. Machado was present for delivery    Maria Esther Mendez MD  Obstetrics and Gynecology, PGY-3  2020, 9:51 PM        OB Vaginal Delivery Note    Leigh Roberts MRN# [id number]   Age: 25 year old YOB: 1995       GA: Unknown  GP:   Labor Complications: None   EBL: 200  mL  Delivery QBL:    Delivery Type:    ROM to Delivery Time: rupture date or rupture time have not been documented  [place] Weight: 0.569 kg (1 lb 4.1 oz)    1 Minute 5 Minute 10 Minute   Apgar Totals: 0   0   0     MARIA ESTHER MENDEZ;JAIME AWAN;MARGA MACHADO     Delivery Details:  Leigh Roberts, a 25 year old  female delivered a viable infant with apgars of 0  and 0 . Patient was fully dilated and pushing after   hours   minutes in active labor. Delivery was via   to a sterile field under spinal  anesthesia. Infant delivered in breech        position. Anterior and posterior shoulders delivered without difficulty. The cord was clamped, cut twice and   were noted. Cord blood was obtained in routine fashion with the following disposition:  .       Cord complications:    Placenta delivered at 10/17/2020  9:26 PM . Placental disposition was Pathology . Fundal massage performed and fundus found to be firm.     Episiotomy: none    Perineum, vagina, cervix were inspected, and the following lacerations were noted:   Perineal lacerations: none                No lacerations.    Excellent hemostasis was noted. Needle count correct.  Patient in delivery room in good and stable condition.        Colledge, Pending Baby Leigh SCHREIBER [[id number]]    Labor Event Times    Labor onset date: 10/17/20 Onset time:  4:30 PM   Dilation complete date: 10/17/20 Complete time:  7:15 PM   Start pushing date/time: 10/17/2020 2120      Labor Events     labor?: No   steroids: None  Labor Type: Induction/Cervical ripening     Antibiotics received during labor?: No     Rupture identifier: Sac 1  Rupture date/time: 10/17/20 2122   Rupture type: Artificial Rupture of Membranes  Fluid color: Other (comment)     Induction: Misoprostol  Induction date/time:     Cervical ripening date/time:     Indications for induction: Fetal Abnormality     1:1 continuous labor support provided by?: none Labor partogram used?: no      Delivery/Placenta Date and Time    Delivery Date: 10/17/20 Delivery Time:  9:22 PM   Placenta Date/Time: 10/17/2020  9:26 PM  Oxytocin given at the time of delivery: after delivery of placenta     Vaginal Counts     Initial count performed by 2 team members:  Two Team Members   jaydon castrejon MD       Needles Suture [employer] Sponges Instruments   Initial counts 1 0 5    Added to count 0 0 0    Final counts 1 0 5    Placed during labor Accounted for at the end of labor   No NA   No NA   No NA    Final count performed by 2 team members:  Two Team Members   jaydon castrejon MD      Final count correct?: Yes     Apgars    Living status: Fetal Demise   1 Minute 5 Minute 10 Minute 15 Minute 20 Minute   Skin color: 0  0  0  0  0    Heart rate: 0  0   "0  0  0    Reflex irritability: 0  0  0  0  0    Muscle tone: 0  0  0  0  0    Respiratory effort: 0  0  0  0  0    Total: 0  0  0  0  0    Apgars assigned by: MARK NELSON RN      Measurements    Weight: 1 lb 4.1 oz Length: 10\"      Delivery (Maternal) (Provider to Complete) ([id number])    Episiotomy: None  Perineal lacerations: None    Est. blood loss (mL): 200     Blood Loss  Mother: Leigh Roberts #[id number]   Start of Mother's Information    IO Blood Loss  10/17/20 1630 - 10/18/20 0007    EBL (mL) Hospital Encounter 200 mL    Delivery QBL (mL) Hospital Encounter 180 mL    Total  380 mL         End of Mother's Information  Mother: Leigh Roberts #[id number]          Delivery - Provider to Complete ([id number])                   Other personnel:  Provider Role   Christine Toney MD Resident   Gilma Nelson RN Delivery Nurse   Jackie Machado MD Obstetrician                Placenta    Date/Time: 10/17/2020  9:26 PM  Removal: Spontaneous  Disposition: Pathology           Anesthesia    Method: Spinal     Analgesic:  BIRTH HISTORY: ANALGESIC   FENTANYL 2 MCG/ML BUPIVACAINE 0.125% IN NS OB EPIDURAL MIXTURE             Presentation and Position    Presentation: Breech                  Christine Toney MD     Staff MD Note  I was present and scrubbed for the entire procedure noted above.  I agree with the description above and any necessary changes have been made by me.  Jackie Machado MD    "

## 2020-10-18 NOTE — PROVIDER NOTIFICATION
10/17/20 2257   Provider Notification   Provider Name/Title Dr. Toney   Method of Notification Electronic Page   Request Evaluate - Remote   Notification Reason Maternal Vital Sign Change   Notified of elevated temperatures. Patient denies chills or feeling feverish.

## 2020-10-18 NOTE — PROGRESS NOTES
Leigh Roberts is a 25 year old , who presented at 22w4d who presented for induction termination due to fetal anomalies secondary to primary CMV infection.  Fetal demise was confirmed by ultrasound. She was induced with misoprostol and delivered a markedly pale-appearing stillborn male infant, weighing 569g, at 10/17 0922. The infant had a 3 vessel nuchal cord.     External examination reveals a well-developed male infant. The features of the infant were without syndromic appearance. External anatomy was within normal limits and mouth  and anus were patent without cleft lip or palette. The infant has two eyes with orbits, a small anteverted nose with patent nares, an intact lip and palate, and a normal chin. The head is grossly unremarkable with normally formed ears. There is generalized neck thickening. The thorax is grossly unremarkable, has two nipple buds, and the sternum ends approximately half the distance from nipple line to umbilicus. The abdomen is distended with ascites like fluid. The vertebral column is intact, and there are five digits on all extremities with no syndactyly or abnormal creases. The anus is patent and there are no abnormal joint contractures. There is an attached pink-white three vessel cord that appears normal in caliber and length. Head circumference was 18 cm, foot length 2 cm, femur length 4 cm, and humerus length 4 cm.    Anabel Little MD ([facility])  [facility] O[facility] PGY-3  10/18/2020  2:32 PM

## 2020-11-09 LAB — COPATH REPORT: NORMAL

## 2021-05-11 ENCOUNTER — E-VISIT (OUTPATIENT)
Dept: URGENT CARE | Facility: CLINIC | Age: 26
End: 2021-05-11
Payer: COMMERCIAL

## 2021-05-11 DIAGNOSIS — Z20.822 SUSPECTED COVID-19 VIRUS INFECTION: ICD-10-CM

## 2021-05-11 DIAGNOSIS — J02.9 SORE THROAT: ICD-10-CM

## 2021-05-11 PROCEDURE — 99421 OL DIG E/M SVC 5-10 MIN: CPT | Performed by: EMERGENCY MEDICINE

## 2021-05-11 NOTE — PATIENT INSTRUCTIONS
Dear Leigh Roberts,    Your symptoms show that you may have coronavirus (COVID-19). This illness can cause fever, cough and trouble breathing. Many people get a mild case and get better on their own. Some people can get very sick.    Because you also reported sore throat I would like to also test you for Strep Throat to determine if we need to treat you for that as well.    What should I do?  We would like to test you for Covid-19 virus and Strep Throat. I have placed orders for these tests.   To schedule: go to your [employer] home page and scroll down to the section that says  You have an appointment that needs to be scheduled  and click the large green button that says  Schedule Now  and follow the steps to find the next available openings. It is important that when you are asked what the reason for your appointment is that you mention you need BOTH Covid and Strep tests.    If you are unable to complete these [employer] scheduling steps, please call [phone] to schedule your testing.     Return to work/school/ guidance:   Please let your workplace manager and staffing office know when your quarantine ends     We can t give you an exact date as it depends on the above. You can calculate this on your own or work with your manager/staffing office to calculate this. (For example if you were exposed on 10/4, you would have to quarantine for 14 full days. That would be through 10/18. You could return on 10/19.)      If you receive a positive COVID-19 test result, follow the guidance of the those who are giving you the results. Usually the return to work is 10 (or in some cases 20 days from symptom onset.) If you work at [employer], you must also be cleared by Employee Occupational Health and Safety to return to work.        If you receive a negative COVID-19 test result and did not have a high risk exposure to someone with a known positive COVID-19 test, you can return to work once you're free of  fever for 24 hours without fever-reducing medication and your symptoms are improving or resolved.      If you receive a negative COVID-19 test and If you had a high risk exposure to someone who has tested positive for COVID-19 then you can return to work 14 days after your last contact with the positive individual    Note: If you have ongoing exposure to the covid positive person, this quarantine period may be more than 14 days. (For example, if you are continued to be exposed to your child who tested positive and cannot isolate from them, then the quarantine of 7-14 days can't start until your child is no longer contagious. This is typically 10 days from onset of the child's symptoms. So the total duration may be 17-24 days in this case.)    Sign up for [employer].   We know it's scary to hear that you might have COVID-19. We want to track your symptoms to make sure you're okay over the next 2 weeks. Please look for an email from [employer]--this is a free, online program that we'll use to keep in touch. To sign up, follow the link in the email you will receive. Learn more at [email or web address]    How can I take care of myself?    Get lots of rest. Drink extra fluids (unless a doctor has told you not to)    Take Tylenol (acetaminophen) or ibuprofen for fever or pain. If you have liver or kidney problems, ask your family doctor if it's okay to take Tylenol o ibuprofen    If you have other health problems (like cancer, heart failure, an organ transplant or severe kidney disease): Call your specialty clinic if you don't feel better in the next 2 days.    Know when to call 911. Emergency warning signs include:  o Trouble breathing or shortness of breath  o Pain or pressure in the chest that doesn't go away  o Feeling confused like you haven't felt before, or not being able to wake up  o Bluish-colored lips or face    Where can I get more information?  [facility] - About COVID-19:    [email or web address]    CDC - What to Do If You're Sick:   www.cdc.gov/coronavirus/2019-ncov/about/steps-when-sick.html    May 11, 2021  RE:  Leigh Roberts                                                                                                                  [address]   [place] [address]      To whom it may concern:    I evaluated Leigh Roberts on May 11, 2021. Leigh Roberts should be excused from work/school.     They should let their workplace manager and staffing office know when their quarantine ends.    We can not give an exact date as it depends on the information below. They can calculate this on their own or work with their manager/staffing office to calculate this. (For example if they were exposed on 10/04, they would have to quarantine for 14 full days. That would be through 10/18. They could return on 10/19.)    Quarantine Guidelines:      If patient receives a positive COVID-19 test result, they should follow the guidance of those who are giving the results. Usually the return to work is 10 (or in some cases 20 days from symptom onset.) If they work at [employer] [facility], they must be cleared by Employee Occupational Health and Safety to return to work.        If patient receives a negative COVID-19 test result and did not have a high risk exposure to someone with a known positive COVID-19 test, they can return to work once they're free of fever for 24 hours without fever-reducing medication and their symptoms are improving or resolved.      If patient receives a negative COVID-19 test and if they had a high risk exposure to someone who has tested positive for COVID-19 then they can return to work 14 days after their last contact with the positive individual    Note: If there is ongoing exposure to the covid positive person, this quarantine period may be longer than 14 days. (For example, if they are continually exposed to their child, who tested  positive and cannot isolate from them, then the quarantine of 7-14 days can't start until their child is no longer contagious. This is typically 10 days from onset to the child's symptoms. So the total duration may be 17-24 days in this case.)     Sincerely,  William Robledo MD

## 2021-06-20 ENCOUNTER — HEALTH MAINTENANCE LETTER (OUTPATIENT)
Age: 26
End: 2021-06-20

## 2021-08-17 ENCOUNTER — LAB REQUISITION (OUTPATIENT)
Dept: LAB | Facility: CLINIC | Age: 26
End: 2021-08-17

## 2021-08-17 PROCEDURE — U0005 INFEC AGEN DETEC AMPLI PROBE: HCPCS | Performed by: FAMILY MEDICINE

## 2021-08-18 LAB — SARS-COV-2 RNA RESP QL NAA+PROBE: POSITIVE

## 2021-08-22 ENCOUNTER — NURSE TRIAGE (OUTPATIENT)
Dept: NURSING | Facility: CLINIC | Age: 26
End: 2021-08-22

## 2021-08-22 ENCOUNTER — HOSPITAL ENCOUNTER (EMERGENCY)
Facility: CLINIC | Age: 26
Discharge: HOME | End: 2021-08-22
Attending: EMERGENCY MEDICINE | Admitting: EMERGENCY MEDICINE
Payer: COMMERCIAL

## 2021-08-22 VITALS
TEMPERATURE: 98.6 F | OXYGEN SATURATION: 95 % | WEIGHT: 250 LBS | BODY MASS INDEX: 36.39 KG/M2 | SYSTOLIC BLOOD PRESSURE: 113 MMHG | HEART RATE: 90 BPM | DIASTOLIC BLOOD PRESSURE: 72 MMHG | RESPIRATION RATE: 18 BRPM

## 2021-08-22 DIAGNOSIS — U07.1 2019 NOVEL CORONAVIRUS DISEASE (COVID-19): ICD-10-CM

## 2021-08-22 LAB
ALBUMIN SERPL-MCNC: 3.8 G/DL (ref 3.4–5)
ALP SERPL-CCNC: 55 U/L (ref 40–150)
ALT SERPL W P-5'-P-CCNC: 33 U/L (ref 0–50)
ANION GAP SERPL CALCULATED.3IONS-SCNC: 9 MMOL/L (ref 3–14)
AST SERPL W P-5'-P-CCNC: 37 U/L (ref 0–45)
BASOPHILS # BLD AUTO: 0 10E3/UL (ref 0–0.2)
BASOPHILS NFR BLD AUTO: 0 %
BILIRUB SERPL-MCNC: 0.4 MG/DL (ref 0.2–1.3)
BUN SERPL-MCNC: 9 MG/DL (ref 7–30)
CALCIUM SERPL-MCNC: 8.3 MG/DL (ref 8.5–10.1)
CHLORIDE BLD-SCNC: 104 MMOL/L (ref 94–109)
CO2 SERPL-SCNC: 22 MMOL/L (ref 20–32)
CREAT SERPL-MCNC: 0.87 MG/DL (ref 0.52–1.04)
EOSINOPHIL # BLD AUTO: 0 10E3/UL (ref 0–0.7)
EOSINOPHIL NFR BLD AUTO: 0 %
ERYTHROCYTE [DISTWIDTH] IN BLOOD BY AUTOMATED COUNT: 11.9 % (ref 10–15)
GFR SERPL CREATININE-BSD FRML MDRD: >90 ML/MIN/1.73M2
GLUCOSE BLD-MCNC: 106 MG/DL (ref 70–99)
HCT VFR BLD AUTO: 39.7 % (ref 35–47)
HGB BLD-MCNC: 13.9 G/DL (ref 11.7–15.7)
HOLD SPECIMEN: NORMAL
IMM GRANULOCYTES # BLD: 0 10E3/UL
IMM GRANULOCYTES NFR BLD: 0 %
LACTATE SERPL-SCNC: 0.9 MMOL/L (ref 0.7–2)
LYMPHOCYTES # BLD AUTO: 0.8 10E3/UL (ref 0.8–5.3)
LYMPHOCYTES NFR BLD AUTO: 30 %
MCH RBC QN AUTO: 27.7 PG (ref 26.5–33)
MCHC RBC AUTO-ENTMCNC: 35 G/DL (ref 31.5–36.5)
MCV RBC AUTO: 79 FL (ref 78–100)
MONOCYTES # BLD AUTO: 0.2 10E3/UL (ref 0–1.3)
MONOCYTES NFR BLD AUTO: 6 %
NEUTROPHILS # BLD AUTO: 1.8 10E3/UL (ref 1.6–8.3)
NEUTROPHILS NFR BLD AUTO: 64 %
NRBC # BLD AUTO: 0 10E3/UL
NRBC BLD AUTO-RTO: 0 /100
PLATELET # BLD AUTO: 148 10E3/UL (ref 150–450)
POTASSIUM BLD-SCNC: 3.2 MMOL/L (ref 3.4–5.3)
PROT SERPL-MCNC: 8 G/DL (ref 6.8–8.8)
RBC # BLD AUTO: 5.01 10E6/UL (ref 3.8–5.2)
SODIUM SERPL-SCNC: 135 MMOL/L (ref 133–144)
WBC # BLD AUTO: 2.9 10E3/UL (ref 4–11)

## 2021-08-22 PROCEDURE — 258N000003 HC RX IP 258 OP 636: Performed by: EMERGENCY MEDICINE

## 2021-08-22 PROCEDURE — 250N000011 HC RX IP 250 OP 636: Performed by: EMERGENCY MEDICINE

## 2021-08-22 PROCEDURE — 96361 HYDRATE IV INFUSION ADD-ON: CPT | Performed by: EMERGENCY MEDICINE

## 2021-08-22 PROCEDURE — 85025 COMPLETE CBC W/AUTO DIFF WBC: CPT | Performed by: EMERGENCY MEDICINE

## 2021-08-22 PROCEDURE — 36415 COLL VENOUS BLD VENIPUNCTURE: CPT | Performed by: EMERGENCY MEDICINE

## 2021-08-22 PROCEDURE — 96374 THER/PROPH/DIAG INJ IV PUSH: CPT | Performed by: EMERGENCY MEDICINE

## 2021-08-22 PROCEDURE — 99284 EMERGENCY DEPT VISIT MOD MDM: CPT | Mod: 25 | Performed by: EMERGENCY MEDICINE

## 2021-08-22 PROCEDURE — 99284 EMERGENCY DEPT VISIT MOD MDM: CPT | Performed by: EMERGENCY MEDICINE

## 2021-08-22 PROCEDURE — 83605 ASSAY OF LACTIC ACID: CPT | Performed by: EMERGENCY MEDICINE

## 2021-08-22 PROCEDURE — 82040 ASSAY OF SERUM ALBUMIN: CPT | Performed by: EMERGENCY MEDICINE

## 2021-08-22 RX ORDER — KETOROLAC TROMETHAMINE 30 MG/ML
30 INJECTION, SOLUTION INTRAMUSCULAR; INTRAVENOUS ONCE
Status: COMPLETED | OUTPATIENT
Start: 2021-08-22 | End: 2021-08-22

## 2021-08-22 RX ORDER — SODIUM CHLORIDE 9 MG/ML
INJECTION, SOLUTION INTRAVENOUS CONTINUOUS
Status: DISCONTINUED | OUTPATIENT
Start: 2021-08-22 | End: 2021-08-22 | Stop reason: HOSPADM

## 2021-08-22 RX ORDER — ONDANSETRON 2 MG/ML
4 INJECTION INTRAMUSCULAR; INTRAVENOUS EVERY 30 MIN PRN
Status: DISCONTINUED | OUTPATIENT
Start: 2021-08-22 | End: 2021-08-22 | Stop reason: HOSPADM

## 2021-08-22 RX ADMIN — SODIUM CHLORIDE 1000 ML: 9 INJECTION, SOLUTION INTRAVENOUS at 07:04

## 2021-08-22 RX ADMIN — KETOROLAC TROMETHAMINE 30 MG: 30 INJECTION, SOLUTION INTRAMUSCULAR; INTRAVENOUS at 07:21

## 2021-08-22 NOTE — ED TRIAGE NOTES
Cough, fevers up to 102, dehydration and fatigue 'almost passed out like 3 times last night'. SOB, and covid sx worsening since dx 8/17.

## 2021-08-22 NOTE — ED PROVIDER NOTES
Patient is a 26-year-old female with Covid concerns.  Please see Dr. Edgar's note for her HPI and exam.  Patient was signed out to me as the patient was getting fluids and antiemetics.  Blood work was also ordered and not have any worrisome findings.  Potassium was slightly low as was her white count.  Lactic acid was normal.  With fluids antiemetics she had improvement.  She is given information on Covid and home treatments.  Reasons to return to the department are discussed.     Tito Land MD  08/22/21 9872

## 2021-08-22 NOTE — DISCHARGE INSTRUCTIONS
Get plenty of rest.  Push fluids.  Tylenol or ibuprofen for aches and pains.  This will also help with fevers.  Your Covid test should be available later today or tomorrow.  If you have my chart you can look up the results.  If positive you need to self isolate.  If you do have a positive test you will also be contacted through [employer].  Your symptoms may last up to 2 weeks.  If you have any new concerning symptoms you can return to the ER.

## 2021-08-22 NOTE — ED PROVIDER NOTES
History     Chief Complaint   Patient presents with     Covid Concern     HPI  Leigh Roberts is a 26 year old female who presents to the emergency room for Covid concern.  Symptoms started 1 week ago and tested +2 days after that on 8/17/21.  Had cough, congestion, and body aches.  Symptoms have gotten progressively worse over the week.  Has been running a fever of 102  F.  Cough is nonproductive.  Has generalized body aches and fatigue.  Also had some vomiting.  Feels dehydrated.  Was taking DayQuil and NyQuil to help with her cough but feels that it only works for a few hours and symptoms return.  Has not taken any Tylenol or ibuprofen.  Is not vaccinated against COVID-19.    Allergies:  Allergies   Allergen Reactions     Bactrim [Sulfamethoxazole W/Trimethoprim]        Problem List:    Patient Active Problem List    Diagnosis Date Noted     (induced) termination of pregnancy with unspecified complications 10/17/2020     Priority: Medium        Past Medical History:    History reviewed. No pertinent past medical history.    Past Surgical History:    History reviewed. No pertinent surgical history.    Family History:    History reviewed. No pertinent family history.    Social History:  Marital Status:  Single [1]  Social History     Tobacco Use     Smoking status: Never Smoker     Smokeless tobacco: Never Used   Substance Use Topics     Alcohol use: Never     Drug use: Never        Medications:    acetaminophen (TYLENOL) 325 MG tablet  ibuprofen (ADVIL/MOTRIN) 600 MG tablet          Review of Systems   All other systems reviewed and are negative.      Physical Exam   BP: 116/71  Pulse: 101  Temp: (!) 100.6  F (38.1  C)  Resp: 24  Weight: 113.4 kg (250 lb)  SpO2: 96 %      Physical Exam  Vitals and nursing note reviewed.   Constitutional:       General: She is not in acute distress.     Appearance: She is obese. She is ill-appearing. She is not diaphoretic.   HENT:      Head: Atraumatic.      Mouth/Throat:       Pharynx: No oropharyngeal exudate.   Eyes:      General: No scleral icterus.     Pupils: Pupils are equal, round, and reactive to light.   Cardiovascular:      Rate and Rhythm: Normal rate and regular rhythm.      Heart sounds: Normal heart sounds.   Pulmonary:      Effort: Pulmonary effort is normal. No respiratory distress.      Breath sounds: Normal breath sounds. No wheezing.      Comments: cough  Abdominal:      General: Bowel sounds are normal.      Palpations: Abdomen is soft.      Tenderness: There is no abdominal tenderness.   Musculoskeletal:         General: No tenderness.   Skin:     General: Skin is warm.      Findings: No rash.   Neurological:      Mental Status: She is alert.         ED Course        Procedures              Critical Care time:  none               Results for orders placed or performed during the hospital encounter of 08/22/21 (from the past 24 hour(s))   [facility]    Narrative    The following orders were created for panel order [facility].  Procedure                               Abnormality         Status                     ---------                               -----------         ------                     Extra Blood Culture Bottle[[id number]]                       In process                 Extra Blue Top Tube[[id number]]                              In process                 Extra Red Top Tube[[id number]]                               In process                 Extra Green Top (Lithium...[[id number]]                      In process                 Extra Purple Top Tube[[id number]]                            In process                 Extra Green Top (Lithium...[[id number]]                      In process                 Extra Green Top (Lithium...[[id number]]                      In process                   Please view results for these tests on the individual orders.   [facility]    Narrative    The following orders were created for panel order [facility].  Procedure                                Abnormality         Status                     ---------                               -----------         ------                     Extra Blood Culture Bottle[[id number]]                       In process                   Please view results for these tests on the individual orders.       Medications   0.9% sodium chloride BOLUS (has no administration in time range)   0.9% sodium chloride BOLUS (1,000 mLs Intravenous New Bag 8/22/21 0704)     Followed by   sodium chloride 0.9% infusion (has no administration in time range)   ondansetron (ZOFRAN) injection 4 mg (has no administration in time range)   ketorolac (TORADOL) injection 30 mg (has no administration in time range)       Assessments & Plan (with Medical Decision Making)  Stephanie is a 26-year-old female who presents to the emergency room for Covid concern.  Symptoms have been going on for the last week.  Has not been vaccinated against COVID-19.  See history focused physical exam as above  Ill-appearing 26-year-old female in no acute distress, is febrile with a temp of 100.6  F with very mild tachycardia of 101 bpm, remainder vital signs are stable.  Oxygen saturation is 96% on room air.  No increased work of breathing, audible stridor or wheezing.  Peripheral line was inserted to give IV fluids, will give Zofran and Toradol to help with nausea, body aches, headache, and fever.  Will treat symptomatically and see if she improves enough to go home.  At this point does not meet criteria for hospitalization.  And out at change of shift to Dr. Tito Kimball, he will follow up on the patient condition and and disposition.     I have reviewed the nursing notes.    I have reviewed the findings, diagnosis, plan and need for follow up with the patient.       New Prescriptions    No medications on file       Final diagnoses:   2019 novel coronavirus disease (COVID-19)       8/22/2021   [facility] EMERGENCY DEPT     [place]  Corrine Verma DO  08/22/21 0712

## 2021-08-22 NOTE — ED NOTES
Lactic, BVG and one set of blood cultures drawn with IV start, sent to lab. Pt tolerated well. VS monitoring. IV fluids infusing.

## 2021-08-22 NOTE — TELEPHONE ENCOUNTER
Triage note:    Patient is covid positive. Patient feels that she can not catch her breath with coughing and vomiting.Patient states she is dizzy when she gets up. Patient states that she has SOB when at rest. Patient denies severe difficulty breathing.    Per protocol patient to go to ED now. Given care advice per protocol. Patient verbalized understanding and agrees to plan of care.    Layla Blue RN   08/22/21 2:34 AM  [facility] Nurse Advisor  COVID 19 Nurse Triage Plan/Patient Instructions    Please be aware that novel coronavirus (COVID-19) may be circulating in the community. If you develop symptoms such as fever, cough, or SOB or if you have concerns about the presence of another infection including coronavirus (COVID-19), please contact your health care provider or visit [email or web address].     Disposition/Instructions    ED Visit recommended. Follow protocol based instructions.     Bring Your Own Device:  Please also bring your smart device(s) (smart phones, tablets, laptops) and their charging cables for your personal use and to communicate with your care team during your visit.    Thank you for taking steps to prevent the spread of this virus.  o Limit your contact with others.  o Wear a simple mask to cover your cough.  o Wash your hands well and often.    Resources    [facility]: About COVID-19: [email or web address]    CDC: What to Do If You're Sick: www.cdc.gov/coronavirus/2019-ncov/about/steps-when-sick.html    CDC: Ending Home Isolation: www.cdc.gov/coronavirus/2019-ncov/hcp/disposition-in-home-patients.html     CDC: Caring for Someone: www.cdc.gov/coronavirus/2019-ncov/if-you-are-sick/care-for-someone.html     [facility]: Interim Guidance for Hospital Discharge to Home: www.health.[email or web address]/diseases/coronavirus/hcp/hospdischarge.pdf    [facility] clinical trials (COVID-19 research studies): [email or web address]     Below are the  COVID-19 hotlines at the [place] Department of Health ([facility]). Interpreters are available.   o For health questions: Call 876-839-5041 or 1-635.116.8972 (7 a.m. to 7 p.m.)  o For questions about schools and childcare: Call 799-131-1314 or 1-145.892.2375 (7 a.m. to 7 p.m.)                       Reason for Disposition    MODERATE difficulty breathing (e.g., speaks in phrases, SOB even at rest, pulse 100-120)    Additional Information    Negative: SEVERE difficulty breathing (e.g., struggling for each breath, speaks in single words)    Negative: Difficult to awaken or acting confused (e.g., disoriented, slurred speech)    Negative: Bluish (or gray) lips or face now    Negative: Shock suspected (e.g., cold/pale/clammy skin, too weak to stand, low BP, rapid pulse)    Negative: Sounds like a life-threatening emergency to the triager    Negative: [1] COVID-19 exposure AND [2] has not completed COVID-19 vaccine series AND [3] no symptoms    Negative: [1] COVID-19 exposure AND [2] completed COVID-19 vaccine series (fully vaccinated) AND [3] no symptoms    Negative: COVID-19 vaccine reaction suspected (e.g., fever, headache, muscle aches) occurring during days 1-3 after getting vaccine    Negative: COVID-19 vaccine, questions about    Negative: [1] COVID-19 vaccine series completed (fully vaccinated) in past 3 months AND [2] new-onset of COVID-19 symptoms BUT [3] no known exposure    Negative: [1] Had lab test confirmed COVID-19 infection within last 3 monthsAND [2] new-onset of COVID-19 symptoms BUT [3] no known exposure    Negative: [1] Lives with someone known to have influenza (flu test positive) AND [2] flu-like symptoms (e.g., cough, runny nose, sore throat, SOB; with or without fever)    Negative: [1] Adult with possible COVID-19 symptoms AND [2] triager concerned about severity of symptoms or other causes    Negative: COVID-19 and breastfeeding, questions about    Negative: SEVERE or constant chest pain or pressure  (Exception: mild central chest pain, present only when coughing)    Protocols used: CORONAVIRUS (COVID-19) DIAGNOSED OR [id number]-H-RS 3.25

## 2021-10-11 ENCOUNTER — HEALTH MAINTENANCE LETTER (OUTPATIENT)
Age: 26
End: 2021-10-11

## 2021-10-15 ENCOUNTER — LAB REQUISITION (OUTPATIENT)
Dept: LAB | Facility: CLINIC | Age: 26
End: 2021-10-15

## 2021-10-15 PROCEDURE — 87491 CHLMYD TRACH DNA AMP PROBE: CPT | Performed by: FAMILY MEDICINE

## 2021-10-16 LAB
C TRACH DNA SPEC QL PROBE+SIG AMP: NEGATIVE
N GONORRHOEA DNA SPEC QL NAA+PROBE: NEGATIVE

## 2022-01-17 ENCOUNTER — LAB REQUISITION (OUTPATIENT)
Dept: LAB | Facility: CLINIC | Age: 27
End: 2022-01-17

## 2022-01-17 LAB — HBV SURFACE AB SERPL IA-ACNC: 0 M[IU]/ML

## 2022-01-17 PROCEDURE — 86481 TB AG RESPONSE T-CELL SUSP: CPT | Performed by: INTERNAL MEDICINE

## 2022-01-17 PROCEDURE — 86706 HEP B SURFACE ANTIBODY: CPT | Performed by: INTERNAL MEDICINE

## 2022-01-19 LAB
GAMMA INTERFERON BACKGROUND BLD IA-ACNC: 0.15 IU/ML
M TB IFN-G BLD-IMP: NEGATIVE
M TB IFN-G CD4+ BCKGRND COR BLD-ACNC: 9.85 IU/ML
MITOGEN IGNF BCKGRD COR BLD-ACNC: 0.03 IU/ML
MITOGEN IGNF BCKGRD COR BLD-ACNC: 0.06 IU/ML
QUANTIFERON MITOGEN: 10 IU/ML
QUANTIFERON NIL TUBE: 0.15 IU/ML
QUANTIFERON TB1 TUBE: 0.21 IU/ML
QUANTIFERON TB2 TUBE: 0.18

## 2022-04-05 ENCOUNTER — OFFICE VISIT (OUTPATIENT)
Dept: FAMILY MEDICINE | Facility: CLINIC | Age: 27
End: 2022-04-05
Payer: COMMERCIAL

## 2022-04-05 VITALS
BODY MASS INDEX: 39.71 KG/M2 | DIASTOLIC BLOOD PRESSURE: 78 MMHG | WEIGHT: 272.8 LBS | RESPIRATION RATE: 18 BRPM | HEART RATE: 80 BPM | SYSTOLIC BLOOD PRESSURE: 114 MMHG | TEMPERATURE: 97.7 F

## 2022-04-05 DIAGNOSIS — E66.812 CLASS 2 OBESITY DUE TO EXCESS CALORIES WITHOUT SERIOUS COMORBIDITY WITH BODY MASS INDEX (BMI) OF 39.0 TO 39.9 IN ADULT: Primary | ICD-10-CM

## 2022-04-05 DIAGNOSIS — E28.2 PCOS (POLYCYSTIC OVARIAN SYNDROME): ICD-10-CM

## 2022-04-05 DIAGNOSIS — E66.09 CLASS 2 OBESITY DUE TO EXCESS CALORIES WITHOUT SERIOUS COMORBIDITY WITH BODY MASS INDEX (BMI) OF 39.0 TO 39.9 IN ADULT: Primary | ICD-10-CM

## 2022-04-05 DIAGNOSIS — E88.819 INSULIN RESISTANCE: ICD-10-CM

## 2022-04-05 LAB
HBA1C MFR BLD: 5 % (ref 0–5.6)
INSULIN SERPL-ACNC: 69.6 MU/L (ref 3–25)
PROLACTIN SERPL-MCNC: 17 UG/L (ref 3–27)

## 2022-04-05 PROCEDURE — 84146 ASSAY OF PROLACTIN: CPT | Performed by: PHYSICIAN ASSISTANT

## 2022-04-05 PROCEDURE — 36415 COLL VENOUS BLD VENIPUNCTURE: CPT | Performed by: PHYSICIAN ASSISTANT

## 2022-04-05 PROCEDURE — 99204 OFFICE O/P NEW MOD 45 MIN: CPT | Performed by: PHYSICIAN ASSISTANT

## 2022-04-05 PROCEDURE — 83036 HEMOGLOBIN GLYCOSYLATED A1C: CPT | Performed by: PHYSICIAN ASSISTANT

## 2022-04-05 PROCEDURE — 83525 ASSAY OF INSULIN: CPT | Performed by: PHYSICIAN ASSISTANT

## 2022-04-05 RX ORDER — RESVER/WINE/BFL/GRPSD/PC/C/POM 200MG-60MG
125 CAPSULE ORAL DAILY
COMMUNITY
Start: 2022-01-13

## 2022-04-05 RX ORDER — ESCITALOPRAM OXALATE 10 MG/1
15 TABLET ORAL DAILY
COMMUNITY
Start: 2022-01-13 | End: 2022-09-13

## 2022-04-05 RX ORDER — VALACYCLOVIR HYDROCHLORIDE 1 G/1
1000 TABLET, FILM COATED ORAL DAILY PRN
COMMUNITY
Start: 2021-05-14

## 2022-04-05 ASSESSMENT — PAIN SCALES - GENERAL: PAINLEVEL: NO PAIN (0)

## 2022-04-05 NOTE — PROGRESS NOTES
Assessment & Plan   Class 2 obesity due to excess calories without serious comorbidity with body mass index (BMI) of 39.0 to 39.9 in adult  Body mass index is 39.71 kg/m . Difficulty losing weight. Eats large portions, is a grazer/snacker. Likely has PCOS as well. Will check A1c. Referral to weight management center placed today.   - [facility]; Future  - Hemoglobin A1c; Future  - Hemoglobin A1c    PCOS (polycystic ovarian syndrome)  Hx of this. No work up other than pelvic ultrasounds. Has hirsutism, acne, weight issues, and irregular menstrual cycles. Will work up with insulin and prolactin levels. Discussed treatment but was deferred today. Can start treatment at any time.   - Prolactin; Future  - Insulin level; Future    Return if symptoms worsen or fail to improve, for In-Clinic Visit.    [facility]    Leonard Kellogg is a 27 year old who presents for the following health issues     HPI   Concern - Weight loss   Onset:   Description: States that on the intranet under the benefits and wellness she found a 24 week weight loss program and it says to make an appointment with a provider   Intensity:   Progression of Symptoms:    Accompanying Signs & Symptoms:   Previous history of similar problem:   Precipitating factors:        Worsened by:   Alleviating factors:        Improved by:   Therapies tried and outcome:  none     Review of Systems   See HPI       Objective    /78 (BP Location: Right arm, Patient Position: Sitting, Cuff Size: Adult Large)   Pulse 80   Temp 97.7  F (36.5  C) (Tympanic)   Resp 18   Wt 123.7 kg (272 lb 12.8 oz)   BMI 39.71 kg/m    Body mass index is 39.71 kg/m .  Physical Exam   Constitutional: healthy, alert, and no distress  Head: Normocephalic. Atraumatic  Eyes: No conjunctival injection, sclera anicteric  Respiratory: No resp distress.  Musculoskeletal: extremities normal- no gross deformities noted, and  normal muscle tone  Neurologic: Gait normal. CN 2-12 grossly intact  Psychiatric: mentation appears normal and affect normal/bright

## 2022-04-05 NOTE — PATIENT INSTRUCTIONS
Follow-up with [facility] as discus.     We will follow-up with lab results.     Consider treating PCOS in the future.

## 2022-04-06 ENCOUNTER — TELEPHONE (OUTPATIENT)
Dept: FAMILY MEDICINE | Facility: CLINIC | Age: 27
End: 2022-04-06
Payer: COMMERCIAL

## 2022-04-06 RX ORDER — METFORMIN HCL 500 MG
500 TABLET, EXTENDED RELEASE 24 HR ORAL 2 TIMES DAILY WITH MEALS
Qty: 180 TABLET | Refills: 1 | Status: SHIPPED | OUTPATIENT
Start: 2022-04-06 | End: 2022-09-13

## 2022-04-06 NOTE — TELEPHONE ENCOUNTER
Reason for Call:  Request for results:    Name of test or procedure: Pt got her lab results in her My Chart and says she would like to talk to someone about them. She had been seen by Maulik Siegel.    Date of test of procedure: Yesterday    Location of the test or procedure: [place]    Phone number Patient can be reached at:  Home number on file [phone] (home)        Call taken on 4/6/2022 at 8:11 AM by Sonia Dawkins

## 2022-07-17 ENCOUNTER — HEALTH MAINTENANCE LETTER (OUTPATIENT)
Age: 27
End: 2022-07-17

## 2022-09-13 ENCOUNTER — OFFICE VISIT (OUTPATIENT)
Dept: FAMILY MEDICINE | Facility: CLINIC | Age: 27
End: 2022-09-13
Payer: COMMERCIAL

## 2022-09-13 VITALS
DIASTOLIC BLOOD PRESSURE: 60 MMHG | TEMPERATURE: 97.3 F | WEIGHT: 258 LBS | BODY MASS INDEX: 39.1 KG/M2 | SYSTOLIC BLOOD PRESSURE: 100 MMHG | HEIGHT: 68 IN | OXYGEN SATURATION: 99 % | RESPIRATION RATE: 16 BRPM | HEART RATE: 69 BPM

## 2022-09-13 DIAGNOSIS — E88.819 INSULIN RESISTANCE: ICD-10-CM

## 2022-09-13 DIAGNOSIS — R21 RASH AND NONSPECIFIC SKIN ERUPTION: Primary | ICD-10-CM

## 2022-09-13 DIAGNOSIS — E28.2 PCOS (POLYCYSTIC OVARIAN SYNDROME): ICD-10-CM

## 2022-09-13 DIAGNOSIS — F41.9 ANXIETY: ICD-10-CM

## 2022-09-13 DIAGNOSIS — F33.41 RECURRENT MAJOR DEPRESSIVE DISORDER, IN PARTIAL REMISSION (H): ICD-10-CM

## 2022-09-13 DIAGNOSIS — H92.02 OTALGIA, LEFT: ICD-10-CM

## 2022-09-13 PROCEDURE — 90471 IMMUNIZATION ADMIN: CPT | Performed by: FAMILY MEDICINE

## 2022-09-13 PROCEDURE — 90686 IIV4 VACC NO PRSV 0.5 ML IM: CPT | Performed by: FAMILY MEDICINE

## 2022-09-13 PROCEDURE — 99214 OFFICE O/P EST MOD 30 MIN: CPT | Mod: 25 | Performed by: FAMILY MEDICINE

## 2022-09-13 RX ORDER — TRIAMCINOLONE ACETONIDE 1 MG/G
CREAM TOPICAL 2 TIMES DAILY
Qty: 80 G | Refills: 1 | Status: SHIPPED | OUTPATIENT
Start: 2022-09-13 | End: 2023-01-03

## 2022-09-13 RX ORDER — METFORMIN HCL 500 MG
500 TABLET, EXTENDED RELEASE 24 HR ORAL 2 TIMES DAILY WITH MEALS
Qty: 180 TABLET | Refills: 3 | Status: SHIPPED | OUTPATIENT
Start: 2022-09-13

## 2022-09-13 RX ORDER — ESCITALOPRAM OXALATE 10 MG/1
10 TABLET ORAL DAILY
Qty: 90 TABLET | Refills: 3 | Status: SHIPPED | OUTPATIENT
Start: 2022-09-13

## 2022-09-13 RX ORDER — ESCITALOPRAM OXALATE 5 MG/1
5 TABLET ORAL DAILY
Qty: 90 TABLET | Refills: 3 | Status: SHIPPED | OUTPATIENT
Start: 2022-09-13

## 2022-09-13 ASSESSMENT — ANXIETY QUESTIONNAIRES
1. FEELING NERVOUS, ANXIOUS, OR ON EDGE: SEVERAL DAYS
5. BEING SO RESTLESS THAT IT IS HARD TO SIT STILL: NOT AT ALL
7. FEELING AFRAID AS IF SOMETHING AWFUL MIGHT HAPPEN: SEVERAL DAYS
GAD7 TOTAL SCORE: 4
6. BECOMING EASILY ANNOYED OR IRRITABLE: NOT AT ALL
2. NOT BEING ABLE TO STOP OR CONTROL WORRYING: SEVERAL DAYS
3. WORRYING TOO MUCH ABOUT DIFFERENT THINGS: SEVERAL DAYS
GAD7 TOTAL SCORE: 4
IF YOU CHECKED OFF ANY PROBLEMS ON THIS QUESTIONNAIRE, HOW DIFFICULT HAVE THESE PROBLEMS MADE IT FOR YOU TO DO YOUR WORK, TAKE CARE OF THINGS AT HOME, OR GET ALONG WITH OTHER PEOPLE: SOMEWHAT DIFFICULT

## 2022-09-13 ASSESSMENT — PATIENT HEALTH QUESTIONNAIRE - PHQ9
5. POOR APPETITE OR OVEREATING: NOT AT ALL
SUM OF ALL RESPONSES TO PHQ QUESTIONS 1-9: 3

## 2022-09-13 ASSESSMENT — PAIN SCALES - GENERAL: PAINLEVEL: MILD PAIN (2)

## 2022-09-13 NOTE — PROGRESS NOTES
"  Assessment & Plan     Rash and nonspecific skin eruption  Appears to be a contact dermatitis vs eczema. Will treat with steroid cream for 7-10 days. If not improving next steps would be Dermatology referral.   - triamcinolone (KENALOG) 0.1 % external cream  Dispense: 80 g; Refill: 1    Otalgia, left  No evidence of infection.  Discussed conservative cares with Flonase Tylenol, Profen.    PCOS (polycystic ovarian syndrome)  Currently on metformin for PCOS and for weight loss.  On 500 mg twice daily.  Tolerating medication well.  requesting refill.  Refill provided.  - metFORMIN (GLUCOPHAGE XR) 500 MG 24 hr tablet  Dispense: 180 tablet; Refill: 3      Recurrent major depressive disorder, in partial remission (H)  A history of depression and also anxiety.  Lost her son in 2020.  She reports that her mood has been stable and doing well on Lexapro 15 mg daily.  Not currently seeing a therapist.  Not interested at this time.  All questions answered.  Refill provided.  - escitalopram (LEXAPRO) 10 MG tablet  Dispense: 90 tablet; Refill: 3  - escitalopram (LEXAPRO) 5 MG tablet  Dispense: 90 tablet; Refill: 3    Anxiety  As above.   - escitalopram (LEXAPRO) 10 MG tablet  Dispense: 90 tablet; Refill: 3  - escitalopram (LEXAPRO) 5 MG tablet  Dispense: 90 tablet; Refill: 3        Estimated body mass index is 39.23 kg/m  as calculated from the following:    Height as of this encounter: 1.727 m (5' 8\").    Weight as of this encounter: 117 kg (258 lb).   Weight management plan: Discussed healthy diet and exercise guidelines    Follow up in 3-6 months or sooner as needed.     The risks, benefits and treatment options of prescribed medications or other treatments have been discussed with the patient. The patient verbalized their understanding and should call or follow up if no improvement or if they develop further problems.      Austin Morton,   [facility]    Leonard Kellogg is a 27 year old, " "presenting for the following health issues:  Derm Problem (Skin spot on right forehead and mole on her front upper abdomen.) and Ear Problem (Bilateral ear pain.)      History of Present Illness       Reason for visit:  Mass on forehead    She eats 0-1 servings of fruits and vegetables daily.She consumes 2 sweetened beverage(s) daily.She exercises with enough effort to increase her heart rate 20 to 29 minutes per day.  She exercises with enough effort to increase her heart rate 3 or less days per week. She is missing 2 dose(s) of medications per week.     27 year old female who presents to clinic for skin lesion on forehead and abdomen.     Skin rash   2 months ago developed a lesion on the right forehead. Has been using a collagen cream and also a gold cream prior to this.   Lesion will occasionally be itchy.   Not painful.  No drainage.   Stopped using creams a couple months ago.   Has not tried antibiotic ointment.       Left ear pain   for about 1 week.   Hearing feels slightly muffled at times.   Feels wet at times.   Has been using Q-tips.   No ear drainage.   No fevers.   Has not tried any medications at this time.       Mood   Son passed away in 2020.   Uses Lexapro 15 mg   This has been helpful for her anxiety.  Has tried 20 mg in the past but was too much and did not tolerate.   Reports things going well at this time.   No therapist currently.     PCOS  Uses Metformin 500 mg twice daily.   Tolerating well.   Also using for weight loss and is down 14 lbs over last couple of months.   No issues or concerns.     Review of Systems   Constitutional, HEENT, cardiovascular, pulmonary, gi and gu systems are negative, except as otherwise noted.      Objective    /60 (BP Location: Right arm, Patient Position: Chair, Cuff Size: Adult Large)   Pulse 69   Temp 97.3  F (36.3  C) (Tympanic)   Resp 16   Ht 1.727 m (5' 8\")   Wt 117 kg (258 lb)   LMP 08/26/2022 (Approximate)   SpO2 99%   BMI 39.23 kg/m    Body " mass index is 39.23 kg/m .  Physical Exam   General: alert, cooperative, no acute distress   HEENT: NC/AT, PERRL, TM's without signs of infection, nares patent, oropharynx clear and non-erythematous.   CV: RRR, no murmur  Resp: non-labored breathing, clear to auscultation, no wheezing or rales   Abdomen: Soft, non-tender, no guarding.   Extremities: No peripheral edema, calves non-tender.   Appearance: Patient appears well groomed and appropriately dressed.   Orientation: Patient alert and oriented to person, place, and time.  Behavior: Appropriate to setting and is cooperative with mental status examination. No overactivity or catatonia.  Speech: Organized, clear, and concise, with appropriate volume and tone.  Thought: Able to think abstractly. No evidence of tangentiality or circumstantiality.  Perception: No auditory or visual hallucinations. No delusions.  Mood: No evidence of euphoria or dysphoria. No lability.  Affect: Congruent to mood. Not flattened or narrowed.  Insight/Judgement: Insight and judgment intact.  Skin: forehead area of 3 cm with slight erythema and dry skin, no purulent discharge. No fluctuance.

## 2022-09-13 NOTE — PATIENT INSTRUCTIONS
Trial using flonase for the ear discomfort -- if not improving let me know.     Utilize kenalog steroid cream for forehead 7-10 days If not improving let me know.     Continue on metformin 500 mg twice daily.     Continue on Lexapro 15 mg daily.     Follow up in 3-6 months or sooner as needed.

## 2022-10-09 ENCOUNTER — MYC MEDICAL ADVICE (OUTPATIENT)
Dept: FAMILY MEDICINE | Facility: CLINIC | Age: 27
End: 2022-10-09

## 2022-10-12 DIAGNOSIS — L98.9 SKIN LESION: Primary | ICD-10-CM

## 2022-11-13 ENCOUNTER — HOSPITAL ENCOUNTER (EMERGENCY)
Facility: CLINIC | Age: 27
Discharge: HOME | End: 2022-11-13
Attending: PHYSICIAN ASSISTANT | Admitting: PHYSICIAN ASSISTANT
Payer: COMMERCIAL

## 2022-11-13 VITALS
DIASTOLIC BLOOD PRESSURE: 69 MMHG | SYSTOLIC BLOOD PRESSURE: 133 MMHG | OXYGEN SATURATION: 99 % | HEART RATE: 92 BPM | TEMPERATURE: 98.2 F | RESPIRATION RATE: 20 BRPM

## 2022-11-13 DIAGNOSIS — J04.0 LARYNGITIS: ICD-10-CM

## 2022-11-13 DIAGNOSIS — J06.9 VIRAL URI WITH COUGH: ICD-10-CM

## 2022-11-13 LAB
DEPRECATED S PYO AG THROAT QL EIA: NEGATIVE
FLUAV RNA SPEC QL NAA+PROBE: NEGATIVE
FLUBV RNA RESP QL NAA+PROBE: NEGATIVE
SARS-COV-2 RNA RESP QL NAA+PROBE: NEGATIVE

## 2022-11-13 PROCEDURE — G0463 HOSPITAL OUTPT CLINIC VISIT: HCPCS | Mod: CS | Performed by: PHYSICIAN ASSISTANT

## 2022-11-13 PROCEDURE — C9803 HOPD COVID-19 SPEC COLLECT: HCPCS | Performed by: PHYSICIAN ASSISTANT

## 2022-11-13 PROCEDURE — 87636 SARSCOV2 & INF A&B AMP PRB: CPT | Performed by: PHYSICIAN ASSISTANT

## 2022-11-13 PROCEDURE — 99213 OFFICE O/P EST LOW 20 MIN: CPT | Mod: CS | Performed by: PHYSICIAN ASSISTANT

## 2022-11-13 PROCEDURE — 87651 STREP A DNA AMP PROBE: CPT | Performed by: PHYSICIAN ASSISTANT

## 2022-11-13 ASSESSMENT — ENCOUNTER SYMPTOMS
APPETITE CHANGE: 0
ACTIVITY CHANGE: 0
RHINORRHEA: 1
VOICE CHANGE: 1
FEVER: 1
COUGH: 1
CARDIOVASCULAR NEGATIVE: 1
SORE THROAT: 1

## 2022-11-13 ASSESSMENT — ACTIVITIES OF DAILY LIVING (ADL): ADLS_ACUITY_SCORE: 35

## 2022-11-14 LAB — GROUP A STREP BY PCR: NOT DETECTED

## 2022-11-14 NOTE — ED PROVIDER NOTES
History     Chief Complaint   Patient presents with     Cough     With loss of voice      HPI  Leigh Roberts is a 27 year old female who presents with complaints of URI symptoms which began 3 days ago.  Associated symptoms include occasional cough, sore throat (especially felt on the right side), and hoarse voice.  She has had tactile fevers but has not had a temperature above 98.2 degrees F.  Has a feeling of pressure in the sinuses.She is mainly concerned about ruling out strep throat and COVID-19 as well as influenza.    The patient has been taking Tylenol with limited relief of throat pain.  No concerns for breathing or swallowing, chest pain, shortness of breath, abdominal pain, joint pain or rashes, acute vision changes, nausea or vomiting, constipation or diarrhea, or leg pain/swelling. Normal bowel and bladder function. Normal food and fluid intake.      Allergies:  Allergies   Allergen Reactions     Bactrim [Sulfamethoxazole W/Trimethoprim]        Problem List:    Patient Active Problem List    Diagnosis Date Noted     PCOS (polycystic ovarian syndrome) 09/13/2022     Priority: Medium     Recurrent major depressive disorder, in partial remission (H) 09/13/2022     Priority: Medium     Anxiety 09/13/2022     Priority: Medium     (induced) termination of pregnancy with unspecified complications 10/17/2020     Priority: Medium        Past Medical History:    No past medical history on file.    Past Surgical History:    No past surgical history on file.    Family History:    Family History   Problem Relation Age of Onset     Cancer Mother      Cancer Maternal Grandfather      Cancer Paternal Grandfather         Lung       Social History:  Marital Status:  Single [1]  Social History     Tobacco Use     Smoking status: Former     Years: 0.20     Types: Cigarettes     Smokeless tobacco: Never   Vaping Use     Vaping Use: Never used   Substance Use Topics     Alcohol use: Yes     Comment: occ     Drug use:  Not Currently     Types: Methamphetamines        Medications:    escitalopram (LEXAPRO) 10 MG tablet  escitalopram (LEXAPRO) 5 MG tablet  metFORMIN (GLUCOPHAGE XR) 500 MG 24 hr tablet  D-5000 125 MCG (5000 UT) tablet  triamcinolone (KENALOG) 0.1 % external cream  valACYclovir (VALTREX) 1000 mg tablet          Review of Systems   Constitutional: Positive for fever. Negative for activity change and appetite change.   HENT: Positive for congestion, postnasal drip, rhinorrhea, sore throat and voice change.    Respiratory: Positive for cough.    Cardiovascular: Negative.        Physical Exam   BP: 133/69  Pulse: 92  Temp: 98.2  F (36.8  C)  Resp: 20  SpO2: 99 %      Physical Exam  Constitutional:       General: She is not in acute distress.     Appearance: Normal appearance. She is not ill-appearing, toxic-appearing or diaphoretic.   HENT:      Head: Normocephalic and atraumatic. No right periorbital erythema or left periorbital erythema.      Jaw: There is normal jaw occlusion.      Right Ear: Tympanic membrane, ear canal and external ear normal. No middle ear effusion. There is no impacted cerumen. No mastoid tenderness. Tympanic membrane is not injected, perforated, erythematous, retracted or bulging.      Left Ear: Tympanic membrane, ear canal and external ear normal.  No middle ear effusion. There is no impacted cerumen. No mastoid tenderness. Tympanic membrane is not injected, perforated, erythematous, retracted or bulging.      Nose: Congestion present. No rhinorrhea.      Right Sinus: No maxillary sinus tenderness.      Left Sinus: No maxillary sinus tenderness.      Mouth/Throat:      Mouth: Mucous membranes are moist.      Pharynx: Posterior oropharyngeal erythema present. No oropharyngeal exudate.   Cardiovascular:      Rate and Rhythm: Normal rate and regular rhythm.      Pulses: Normal pulses.      Heart sounds: Normal heart sounds. No murmur heard.  Pulmonary:      Effort: Pulmonary effort is normal. No  respiratory distress.      Breath sounds: Normal breath sounds. No stridor. No wheezing, rhonchi or rales.   Chest:      Chest wall: No tenderness.   Musculoskeletal:      Cervical back: Neck supple. No rigidity or tenderness. No muscular tenderness.   Lymphadenopathy:      Cervical: No cervical adenopathy.      Right cervical: No superficial, deep or posterior cervical adenopathy.     Left cervical: No superficial, deep or posterior cervical adenopathy.   Neurological:      Mental Status: She is alert and oriented to person, place, and time.      Motor: No weakness.         ED Course                 Procedures                  Results for orders placed or performed during the hospital encounter of 11/13/22 (from the past 24 hour(s))   Streptococcus A Rapid Scr w Reflx to PCR    Specimen: Throat; Swab   Result Value Ref Range    Group A Strep antigen Negative Negative   Symptomatic; Yes; 11/11/2022 Influenza A/B & SARS-CoV2 (COVID-19) Virus PCR Multiplex Nasopharyngeal    Specimen: Nasopharyngeal; Swab   Result Value Ref Range    Influenza A PCR Negative Negative    Influenza B PCR Negative Negative    SARS CoV2 PCR Negative Negative    Narrative    Testing was performed using the patricia SARS-CoV-2 & Influenza A/B Assay on the patricia [employer] System. This test should be ordered for the detection of SARS-CoV-2 and influenza viruses in individuals who meet clinical and/or epidemiological criteria. Test performance is unknown in asymptomatic patients. This test is for in vitro diagnostic use under the FDA EUA for laboratories certified under CLIA to perform moderate and/or high complexity testing. This test has not been FDA cleared or approved. A negative result does not rule out the presence of PCR inhibitors in the specimen or target RNA in concentration below the limit of detection for the assay. If only one viral target is positive but coinfection with multiple targets is suspected, the sample should be re-tested with  another FDA cleared, approved or authorized test, if coinfection would change clinical management. [facility] are certified under the Clinical Laboratory Improvement Amendments of 1988 (CLIA-88) as qualified to perform moderate and/or high complexity laboratory testing.       Medications - No data to display    Assessments & Plan (with Medical Decision Making)     Pt having symptoms of a viral URI and laryngitis. No clinical evidence of peritonsillar abscess, retropharyngeal abscess, Lemierre's Syndrome, epiglottitis, or Shakir's angina.  He had a results for COVID-19, influenza, and strep pharyngitis today.  No trismus or malocclusion.  No fevers.  Patient is in no apparent distress.    Symptomatic cares discussed including: pushing fluids, rest, OTC cold medication such as use next DM for symptomatic relief of cough and Zyrtec for symptomatic relief of postnasal drip.. For the sore throat patient can use salt water gargles, cough drops, chloraseptic spray/drops and tylenol/ibuprofen. Follow up with PCP if no improvement in 4 to 5 days.     Seek urgent medical evaluation if there are new or worsening symptoms such as fever of 103 degrees F or greater, chest tightness, wheezing, chest pain, shortness of breath, facial pressure, severe headaches, trouble breathing, trouble swallowing, severe or worsening nausea/vomiting, or severe abdominal pain.     Pt/guardian verbalized understanding and agrees with the treatment plan.        I have reviewed the nursing notes.    I have reviewed the findings, diagnosis, plan and need for follow up with the patient.      New Prescriptions    No medications on file       Final diagnoses:   Viral URI with cough   Laryngitis       11/13/2022   [facility] EMERGENCY DEPT     Florencio Hillman PA-C  11/13/22 2104       Florencio Hillman PA-C  11/13/22 2105

## 2022-11-14 NOTE — DISCHARGE INSTRUCTIONS
Symptomatic cares discussed including: pushing fluids, rest, OTC cold medication such as use next DM for symptomatic relief of cough and Zyrtec for symptomatic relief of postnasal drip.. For the sore throat patient can use salt water gargles, cough drops, chloraseptic spray/drops and tylenol/ibuprofen. Follow up with PCP if no improvement in 4 to 5 days.     Seek urgent medical evaluation if there are new or worsening symptoms such as fever of 103 degrees F or greater, chest tightness, wheezing, chest pain, shortness of breath, facial pressure, severe headaches, trouble breathing, trouble swallowing, severe or worsening nausea/vomiting, or severe abdominal pain.

## 2023-01-03 ENCOUNTER — VIRTUAL VISIT (OUTPATIENT)
Dept: FAMILY MEDICINE | Facility: CLINIC | Age: 28
End: 2023-01-03
Payer: COMMERCIAL

## 2023-01-03 DIAGNOSIS — F41.9 ANXIETY: Primary | ICD-10-CM

## 2023-01-03 DIAGNOSIS — E66.09 CLASS 2 OBESITY DUE TO EXCESS CALORIES WITHOUT SERIOUS COMORBIDITY WITH BODY MASS INDEX (BMI) OF 39.0 TO 39.9 IN ADULT: ICD-10-CM

## 2023-01-03 DIAGNOSIS — E66.812 CLASS 2 OBESITY DUE TO EXCESS CALORIES WITHOUT SERIOUS COMORBIDITY WITH BODY MASS INDEX (BMI) OF 39.0 TO 39.9 IN ADULT: ICD-10-CM

## 2023-01-03 DIAGNOSIS — F19.20 CHEMICAL DEPENDENCY (H): ICD-10-CM

## 2023-01-03 DIAGNOSIS — F90.2 ATTENTION DEFICIT HYPERACTIVITY DISORDER (ADHD), COMBINED TYPE: ICD-10-CM

## 2023-01-03 DIAGNOSIS — F33.41 RECURRENT MAJOR DEPRESSIVE DISORDER, IN PARTIAL REMISSION (H): ICD-10-CM

## 2023-01-03 PROCEDURE — 99214 OFFICE O/P EST MOD 30 MIN: CPT | Mod: 95 | Performed by: NURSE PRACTITIONER

## 2023-01-03 NOTE — NURSING NOTE
Per provider's check out note: Send PHQ9 and CAMILA via [employer]. These were sent via [employer].    Danita Obrien CMA (AAMA)

## 2023-01-03 NOTE — PROGRESS NOTES
Bess is a 27 year old who is being evaluated via a billable video visit.      How would you like to obtain your AVS? MyChart     If the video visit is dropped, the invitation should be resent by: Text to cell phone: [phone]     Will anyone else be joining your video visit? No        Assessment & Plan     Anxiety  Controlled, continue Lexapro.   Adding counseling.   Psychiatry referral for ADHD mgmt considerations.   Pt. Wanting Concerta. Offered starting Strattera-low risk of stimulant properties, but pt. Declined this. Highly encouraged psychotherapy and psychiatry consult.   Pt. Frustrated her CD issues are influencing RX choices.   - Comprehensive Weight Management; Future  - Adult Mental Health  Referral; Future  - Adult Mental Health  Referral; Future    Recurrent major depressive disorder, in partial remission (H)  As above.   - Comprehensive Weight Management; Future  - Adult Mental Health  Referral; Future  - Adult Mental Health  Referral; Future    Attention deficit hyperactivity disorder (ADHD), combined type  As above. Uncontrolled, needs  Referrals as noted.   - Comprehensive Weight Management; Future  - Adult Mental Health  Referral; Future  - Adult Mental Health  Referral; Future    Chemical dependency (H)  Stable, has not had formal CD rehabilitation- would likely benefit from some form of this. .   - Adult Mental Health  Referral; Future    Class II Morbid Obesity:  Weight mgmt referral- advised non-stimulant therapies. Pt. Agrees. Healthy habits in the meantime.       Asked to fill out [facility] for [facility].       Return in about 4 weeks (around 1/31/2023) for Physical Exam, Specialty follow-up/Referral.    AYAKA Ayala [facility] RENALDO Kellogg is a 27 year old, presenting for the following health issues:  A.D.H.D and Weight Problem      History of Present Illness       Reason for  "visit:  Adhd medication and weoght loss referal    She eats 0-1 servings of fruits and vegetables daily.She consumes 1 sweetened beverage(s) daily.She exercises with enough effort to increase her heart rate 60 or more minutes per day.  She exercises with enough effort to increase her heart rate 3 or less days per week. She is missing 2 dose(s) of medications per week.  She is not taking prescribed medications regularly due to remembering to take.     Diagnosed in teens. Was treated with concerta in her early teens. She quit medications as a teen. Does not like medications.     Last mental health assessment in 2020. On Lexapro 15 mg    Lost her son in 2020.    Single.   .     Depression- \"always at a constant level of depression and anxiety- always sometimes there, not suicidal at all\".    Not in therapy- switched insurance.     Had weight loss referral, and program was not helpful and wants new referral. Had work-out partners, food program and blood work.   Was not financially able to engage in the program.     Is looking at bariatric program.       Had neuropsychology assessment in [place].   Through [facility]. - not in record.     Has had chemical dependency for methamphetamine.   Sober for 5 years. Asking about Concerta. Does not want stimulant therapies due to CD history.                     Review of Systems   Constitutional, HEENT, cardiovascular, pulmonary, gi and gu systems are negative, except as otherwise noted.      Objective    Vitals - Patient Reported  Pain Score: No Pain (0)      Vitals:  No vitals were obtained today due to virtual visit.    Physical Exam   GENERAL: Healthy, alert and no distress  EYES: Eyes grossly normal to inspection.  No discharge or erythema, or obvious scleral/conjunctival abnormalities.  RESP: No audible wheeze, cough, or visible cyanosis.  No visible retractions or increased work of breathing.    SKIN: Visible skin clear. No significant rash, abnormal " pigmentation or lesions.  NEURO: Cranial nerves grossly intact.  Mentation and speech appropriate for age.  PSYCH: Mentation appears normal, affect normal/bright, judgement and insight intact, normal speech and appearance well-groomed. Frustrated at mental healthcare options.                Video-Visit Details    Type of service:  Video Visit   Video Start Time: 0802  Video End Time:0830    Originating Location (pt. Location): Home  Distant Location (provider location):  Off-site  Platform used for Video Visit: Keli

## 2023-01-25 ENCOUNTER — OFFICE VISIT (OUTPATIENT)
Dept: DERMATOLOGY | Facility: CLINIC | Age: 28
End: 2023-01-25
Attending: FAMILY MEDICINE
Payer: COMMERCIAL

## 2023-01-25 DIAGNOSIS — L68.9 EXCESS BODY AND FACIAL HAIR: ICD-10-CM

## 2023-01-25 DIAGNOSIS — L98.9 SKIN LESION: ICD-10-CM

## 2023-01-25 DIAGNOSIS — L28.0 LICHEN SIMPLEX CHRONICUS: Primary | ICD-10-CM

## 2023-01-25 PROCEDURE — 99203 OFFICE O/P NEW LOW 30 MIN: CPT | Performed by: DERMATOLOGY

## 2023-01-25 NOTE — LETTER
1/25/2023         RE: Leigh Roberts  [address]  [address]  [facility] [place] [facility] [address]        Dear Colleague,    Thank you for referring your patient, Leigh Roberts, to the [facility]. Please see a copy of my visit note below.    Leigh Roberts is an extremely pleasant 27 year old year old female patient I was asked to see by Dr. Morton for hair on face and rash on left forehead.  Uses free and lcear soap, no emollients.  .   Patient states this has been present for a while.  Patient reports the following symptoms:  itching.  Patient reports the following previous treatments none.  These treatments did not work.  Patient reports the following modifying factors none.  Associated symptoms: none.  Patient has no other skin complaints today.  Remainder of the HPI, Meds, PMH, Allergies, FH, and SH was reviewed in chart.      Past Medical History:   Diagnosis Date     Chemical dependency (H) 2018    meth use, quit on her own     CAMILA (generalized anxiety disorder)      Grief at loss of child     fetal demise from maternal CMV infection     Moderate recurrent major depression (H)        Past Surgical History:   Procedure Laterality Date     Labor Induction  10/17/2020    lethal fetal complicaitons from maternal CMV        Family History   Problem Relation Age of Onset     Cancer Mother      Cancer Maternal Grandfather      Cancer Paternal Grandfather         Lung       Social History     Socioeconomic History     Marital status: Single     Spouse name: Not on file     Number of children: Not on file     Years of education: Not on file     Highest education level: Not on file   Occupational History     Not on file   Tobacco Use     Smoking status: Former     Years: 0.20     Types: Cigarettes     Smokeless tobacco: Never   Vaping Use     Vaping Use: Never used   Substance and Sexual Activity     Alcohol use: Yes     Comment: occ     Drug use: Not Currently     Types: Methamphetamines      Sexual activity: Not Currently   Other Topics Concern     Not on file   Social History Narrative     Not on file     Social Determinants of Health     Financial Resource Strain: Not on file   Food Insecurity: Not on file   Transportation Needs: Not on file   Physical Activity: Not on file   Stress: Not on file   Social Connections: Not on file   Intimate Partner Violence: Not on file   Housing Stability: Not on file       Outpatient Encounter Medications as of 1/25/2023   Medication Sig Dispense Refill     D-5000 125 MCG (5000 UT) tablet Take 125 mcg by mouth daily       escitalopram (LEXAPRO) 10 MG tablet Take 1 tablet (10 mg) by mouth daily With 5 mg tablet for 15 mg total 90 tablet 3     escitalopram (LEXAPRO) 5 MG tablet Take 1 tablet (5 mg) by mouth daily With 10 mg tablet for 15 mg total. 90 tablet 3     metFORMIN (GLUCOPHAGE XR) 500 MG 24 hr tablet Take 1 tablet (500 mg) by mouth 2 times daily (with meals) 180 tablet 3     valACYclovir (VALTREX) 1000 mg tablet Take 1,000 mg by mouth daily as needed       No facility-administered encounter medications on file as of 1/25/2023.             O:   NAD, WDWN, Alert & Oriented, Mood & Affect wnl, Vitals stable   Here today alone   General appearance normal   Vitals stable   Alert, oriented and in no acute distress     L forehead lichenified plaque   Red brown hair on chin      Eyes: Conjunctivae/lids:Normal     ENT: Lips, buccal mucosa, tongue: normal    MSK:Normal    Cardiovascular: peripheral edema none    Pulm: Breathing Normal    Neuro/Psych: Orientation:Alert and Orientedx3 ; Mood/Affect:normal       A/P:  1. [facility] [place]  Skin care, soap use and emollient use discussed with patient   nicola twice daily  Return to clinic 2 months  2. Hair on chin  Ipl, laser, vaniqa and elctrolysis discussed with patient   She favor electrolysis   It was a pleasure speaking to Leigh Roberts today.  Previous clinic notes and pertinent laboratory tests were reviewed prior to Leigh MALIK  Alejandra's visit.  Skin care regimen reviewed with patient: Eliminate harsh soaps, i.e. Dial, zest, irsih spring; Mild soaps such as Cetaphil or Dove sensitive skin, avoid hot or cold showers, aggressive use of emollients including vanicream, cetaphil or cerave discussed with patient.          Again, thank you for allowing me to participate in the care of your patient.        Sincerely,        Chemo Franco MD

## 2023-01-25 NOTE — PROGRESS NOTES
Leigh Roberts is an extremely pleasant 27 year old year old female patient I was asked to see by Dr. Morton for hair on face and rash on left forehead.  Uses free and lcear soap, no emollients.  .   Patient states this has been present for a while.  Patient reports the following symptoms:  itching.  Patient reports the following previous treatments none.  These treatments did not work.  Patient reports the following modifying factors none.  Associated symptoms: none.  Patient has no other skin complaints today.  Remainder of the HPI, Meds, PMH, Allergies, FH, and SH was reviewed in chart.      Past Medical History:   Diagnosis Date     Chemical dependency (H) 2018    meth use, quit on her own     CAMILA (generalized anxiety disorder)      Grief at loss of child     fetal demise from maternal CMV infection     Moderate recurrent major depression (H)        Past Surgical History:   Procedure Laterality Date     Labor Induction  10/17/2020    lethal fetal complicaitons from maternal CMV        Family History   Problem Relation Age of Onset     Cancer Mother      Cancer Maternal Grandfather      Cancer Paternal Grandfather         Lung       Social History     Socioeconomic History     Marital status: Single     Spouse name: Not on file     Number of children: Not on file     Years of education: Not on file     Highest education level: Not on file   Occupational History     Not on file   Tobacco Use     Smoking status: Former     Years: 0.20     Types: Cigarettes     Smokeless tobacco: Never   Vaping Use     Vaping Use: Never used   Substance and Sexual Activity     Alcohol use: Yes     Comment: occ     Drug use: Not Currently     Types: Methamphetamines     Sexual activity: Not Currently   Other Topics Concern     Not on file   Social History Narrative     Not on file     Social Determinants of Health     Financial Resource Strain: Not on file   Food Insecurity: Not on file   Transportation Needs: Not on file    Physical Activity: Not on file   Stress: Not on file   Social Connections: Not on file   Intimate Partner Violence: Not on file   Housing Stability: Not on file       Outpatient Encounter Medications as of 1/25/2023   Medication Sig Dispense Refill     D-5000 125 MCG (5000 UT) tablet Take 125 mcg by mouth daily       escitalopram (LEXAPRO) 10 MG tablet Take 1 tablet (10 mg) by mouth daily With 5 mg tablet for 15 mg total 90 tablet 3     escitalopram (LEXAPRO) 5 MG tablet Take 1 tablet (5 mg) by mouth daily With 10 mg tablet for 15 mg total. 90 tablet 3     metFORMIN (GLUCOPHAGE XR) 500 MG 24 hr tablet Take 1 tablet (500 mg) by mouth 2 times daily (with meals) 180 tablet 3     valACYclovir (VALTREX) 1000 mg tablet Take 1,000 mg by mouth daily as needed       No facility-administered encounter medications on file as of 1/25/2023.             O:   NAD, WDWN, Alert & Oriented, Mood & Affect wnl, Vitals stable   Here today alone   General appearance normal   Vitals stable   Alert, oriented and in no acute distress     L forehead lichenified plaque   Red brown hair on chin      Eyes: Conjunctivae/lids:Normal     ENT: Lips, buccal mucosa, tongue: normal    MSK:Normal    Cardiovascular: peripheral edema none    Pulm: Breathing Normal    Neuro/Psych: Orientation:Alert and Orientedx3 ; Mood/Affect:normal       A/P:  1. [facility] [place]  Skin care, soap use and emollient use discussed with patient   nicola twice daily  Return to clinic 2 months  2. Hair on chin  Ipl, laser, vaniqa and elctrolysis discussed with patient   She favor electrolysis   It was a pleasure speaking to Leigh Roberts today.  Previous clinic notes and pertinent laboratory tests were reviewed prior to Leigh Roberts's visit.  Skin care regimen reviewed with patient: Eliminate harsh soaps, i.e. Dial, zest, irsih spring; Mild soaps such as Cetaphil or Dove sensitive skin, avoid hot or cold showers, aggressive use of emollients including vanicream, cetaphil or  cerave discussed with patient.

## 2023-01-26 ENCOUNTER — TELEPHONE (OUTPATIENT)
Dept: DERMATOLOGY | Facility: CLINIC | Age: 28
End: 2023-01-26
Payer: COMMERCIAL

## 2023-01-26 DIAGNOSIS — L28.0 LICHEN SIMPLEX CHRONICUS: Primary | ICD-10-CM

## 2023-01-26 NOTE — TELEPHONE ENCOUNTER
[facility] pharmacy called asking for a prescription to be sent over for the Elidel. Patient is trying to pick it up.     Richelle Gill MA

## 2023-01-27 RX ORDER — PIMECROLIMUS 10 MG/G
CREAM TOPICAL 2 TIMES DAILY
Qty: 30 G | Refills: 3 | Status: SHIPPED | OUTPATIENT
Start: 2023-01-27

## 2023-03-23 ASSESSMENT — SLEEP AND FATIGUE QUESTIONNAIRES
HOW LIKELY ARE YOU TO NOD OFF OR FALL ASLEEP WHEN YOU ARE A PASSENGER IN A CAR FOR AN HOUR WITHOUT A BREAK: WOULD NEVER DOZE
HOW LIKELY ARE YOU TO NOD OFF OR FALL ASLEEP WHILE SITTING QUIETLY AFTER LUNCH WITHOUT ALCOHOL: WOULD NEVER DOZE
HOW LIKELY ARE YOU TO NOD OFF OR FALL ASLEEP WHILE SITTING INACTIVE IN A PUBLIC PLACE: WOULD NEVER DOZE
HOW LIKELY ARE YOU TO NOD OFF OR FALL ASLEEP WHILE SITTING AND TALKING TO SOMEONE: WOULD NEVER DOZE
HOW LIKELY ARE YOU TO NOD OFF OR FALL ASLEEP WHILE LYING DOWN TO REST IN THE AFTERNOON WHEN CIRCUMSTANCES PERMIT: HIGH CHANCE OF DOZING
HOW LIKELY ARE YOU TO NOD OFF OR FALL ASLEEP WHILE SITTING AND READING: WOULD NEVER DOZE
HOW LIKELY ARE YOU TO NOD OFF OR FALL ASLEEP WHILE WATCHING TV: WOULD NEVER DOZE
HOW LIKELY ARE YOU TO NOD OFF OR FALL ASLEEP IN A CAR, WHILE STOPPED FOR A FEW MINUTES IN TRAFFIC: WOULD NEVER DOZE

## 2023-03-28 PROBLEM — O04.80: Status: RESOLVED | Noted: 2020-10-17 | Resolved: 2023-03-28

## 2023-03-28 NOTE — PATIENT INSTRUCTIONS
Welcome to [facility]!      We are grateful that you have chosen us to partner with you on your journey to better health!  We have included some very important information for you to read as you begin your journey towards weight loss surgery. We will discuss parts of this as you move closer to surgery but please reach out if you have any questions or concerns.      Please click on the following links and they will lead you to a printable version of each handout or copy into your browser to view/print at home:    Making Your Decision, Understanding Weight Loss Surgery  [email or web address]    A Roadmap to Your Weight Loss Surgery  [email or web address]    Honoring Choices - Your Rights  [email or web address]    Honoring Choices - [place] Health Care Directive (form that can be filled out)  [email or web address]      Insurance Coverage and Approval for Surgery  Every insurance plan is different.  Many companies approve benefits for surgery, but many have specific requirements that must be completed prior to being approved.    Verification of benefits is the patient's responsibility.  You will be responsible for any charges not covered by your insurance plan - including, but not limited to copays, deductible, out of pocket, any amount over your cap limit, etc.  Using the guideline below, please contact your insurance plan (we recommend you call the Customer Service number on the back of your card).      Once all of the requirements for surgery are complete, you will see the surgeon.  Following this visit, we will submit your information to your insurance plan for Prior Authorization.  We strongly encourage you to submit any documentation that supports your weight loss attempts to us.    Questions that are important to ask your insurance company when you call them:    Are [facility] in my network?  Is bariatric surgery  a covered benefit under my policy?  If so, what is my estimated out of pocket expense?  Are there any exclusions or cap limits on my plan for bariatric surgery?  If so, what are they?  What are the criteria necessary to be approved for bariatric surgery?  Do you require medically supervised weight loss visits for approval of surgery?  If yes, for how many months?  Within the last # of years?  Are the following procedures a covered benefit under my plan?  Laparoscopic Gastric Bypass (CPT Code 00278)  Laparoscopic Sleeve Gastrectomy (CPT Code 55744)  Nutrition/Dietitian Consult (CPT Code 48231  Nutrition/Dietitian Reassessment (CPT Code 17971  Psychological Assessment (CPT Codes 54727 & 47788      Initial labs for Bariatric Surgery    Some lab orders were placed by your doctor in the [facility] system and can be drawn at any of our outpatient hospital labs or your clinic. If you do them at one of three hospitals ([facility] in [place], [facility] in [place], [facility] in [place]) you do not need an appointment but if you'd like to do them at a clinic, you will need to schedule an appointment with that clinic.       You will need to be fasting 10-12 hours prior (you can continue to drink water) and should have them drawn sooner verses later so if any corrections need to be made we will have time to address them.      [facility] lab is open 7 am - 4:30 pm Monday through Friday and 9am-12:30 pm on Saturdays.  [facility]'s lab is open 7 am -4:30 pm Monday through Friday and 8am to 11:30am on Saturday and Sundays.     If you would like them done at a clinic outside the [facility] system, then we will need to know where to fax the orders.   If you have any questions or would like help scheduling a lab appointment at the [facility], please give us a call on the Bariatric Nurse Line at [phone].        [place] [facility] for Bariatric Surgery  Eat 3 nutrient-rich meals each day      Don't skip meals--it will cause you to overeat later in the day! Space meals 4-6 hours apart    Eat around the same times each day to develop a routine eating schedule    Avoid snacking unless physically hungry.   Planned snacks: 1-2 times per day and no more than 150 calories    Eating protein and fiber (vegetables/fruits/whole grains) with meals helps you stay full     Choose foods with less than 10 grams of sugar and 5-10 grams of fat per serving to prevent excess calories and weight gain  Eat protein first    Protein helps with healing, maintaining muscle mass and good nutrition, and reducing hunger     For RNY Gastric Bypass, Sleeve Gastrectomy, and Duodenal Switch: aim for 60-80 grams of protein per day    Eat protein first, then veggies and the fruits or grains  Stop drinking 15-30 minutes before meals and wait 30-45 minutes after eating to drink    Drinking too soon before a meal may cause you to be too full to eat    Drinking too soon after you eat will cause the food to  wash  through your new stomach too quickly--leaving you hungry and likely to eat more    Eat [id number]    Take 20-30 minutes to eat each meal by taking small bites, chewing foods to applesauce consistency or 20-30 times before you swallow    Not chewing food properly can block the opening of your pouch--causing pain, nausea, vomiting    Eating foods too fast can delay those full signals and increase overeating   Slow down your eating by smaller plates/bowls, toddler utensils, putting your fork/spoon down between bites and not watching TV/computer during meals!  Make a list of activities to prevent boredom, stress and emotional eating    Go for a walk or lift weights while watching your favorite TV show    Talk to a co-worker or email/call a friend     Keep your hands and mind busy with woodworking, puzzles, knitting or painting your nails    Practice deep breathing or meditation  Drink 64 ounces of 0-Calorie drinks between meals      Water    Zero calorie Propel , Vitamin Water Zero  or [employer] , Crystal Light , Sugar-Free Gustavo-Aid , and other sugar-free lemonade or flavored kevin    Decaffeinated, unsweetened coffee/ tea (1 cup or less per day)   Avoid Caffeine and Carbonation- NO STRAWS    Caffeine can irritate your new pouch, increase risk for ulcers, and can increase your appetite      Carbonation can lead to increased bloating/gas and discomfort   Work up to a total of 30-60 minutes of physical activity most days of the week    Helps with losing weight and preventing weight regain after surgery     Do a combination of cardio (walking/water exercises/biking/swimming) and strength training  Take daily vitamin/mineral supplements after surgery    Daily use of vitamins/minerals is necessary for the rest of your life      Psychological Evaluation for Bariatric Surgery      Why do I need a psychological evaluation before bariatric surgery?     The psychologist's main purpose is to provide the support and education to ensure you have the most successful outcome after surgery.   Preparing for bariatric surgery often involves changing behavior patterns. Working on these changes before surgery allows you to achieve the best results after surgery as some of these behaviors have been entrenched for many years. In addition, your relationship with food may need to change. Psychologists are experts in behavior change and are there to guide and support you as you make these important changes.   A significant life change, like losing a lot of weight, may affect many areas of your life--self-concept, physical activity and relationships with others. Your psychologist will help you prepare to adjust to these changes in a healthy way.   If you have mental health symptoms, relationship struggles, or other challenges, your psychologist will suggest how to best address these concerns so that they do not interfere with your progress toward a healthier  lifestyle.       Is the psychologist looking for reasons to say I can't have surgery?   The goal is to not find specific psychological problems. Instead, the psychologist will be working with your strengths to ensure you have the most optimal outcome after surgery.  There is no expectation that you will have everything figured out already or that you must have  perfect  behaviors before moving forward with surgery. Your psychologist is expecting that you will have some behavior changes that will need to occur concurrent with the surgery.   You can feel comfortable that the psychologist is not there to    you or your habits. The psychologist is there to provide support and encourage your progress.      What should I expect during the evaluation?   During the interview, which could take place over 2 or more sessions, the psychologist will ask about:   -weight history, current eating pattern and fluid intake, and previous attempts at weight loss   -activity level   -psychiatric history  -drug, alcohol and nicotine use   -social and developmental history  -support system   -current stressors and coping mechanisms   -understanding of the risks of surgery   -expectations for outcomes after surgery   You will complete various questionnaires that will focus on coping strategies, eating behaviors, quality of life and adverse childhood experiences in order to provide additional information to inform the assessment.   Your psychologist will likely give you some  homework assignments  to practice as you prepare for surgery. This will be individually tailored to your specific needs.   Your psychologist will talk with you about some of the typical challenges that patients might encounter after surgery and how to prepare for these.   A final report will be generated and any treatment recommendations will be discussed with you and the bariatric team to determine next steps.   If you would like, you may have any support people  (e.g., spouse) join a session of the evaluation to provide additional information.       Bariatric surgery offers a physical  tool  for weight management. Similarly, your work with the psychologist will provide you with some additional emotional and behavioral  tools  as you work toward your healthier lifestyle goals.      Support Group    Support and accountability is an important part of your weight loss journey and maintenance once you reach your health goals.  Because of this, we require that you attend at least one of our support groups before you meet with the surgeon so you get a feel for what they are like and hopefully establish a connection to others who are going through a similar process or have had surgery before so you can ask your questions.    We currently have two options for support group but they are in the virtual format only at this time.  Both groups are using Microsoft Teams for their platform and you can access it through the web or an isai that can be downloaded to a smart phone if you have one.      The Pre- and Post-op Connections Group is on the second Tuesday of the month from 6:30-8pm and is hosted by Finesse Coto, PhD.  If you are interested in this group, you will need to email him at [email or web address] each month and then he will in turn send you the invitation to join.      We also have a Post-op focused Connections Group the 4th Wednesday of the month from 11am-12pm that is mostly geared toward post-operative patients who are past three months post-op.  This group is hosted by ANGELICA Zavala, CBN, CIC and you can email her to join the group at [email or web address] each month and she will send you an invite similar to Finesse's group.  If you decide you would like to be a regular attender at this group, we can add you to an automatic invitation list of people.    You can see more information about available support groups that the [employer] [facility] offers to our  patients as well by following the link:    The following Support Group information can also be found on our website:  [email or web address]      Please let us know if you have any questions about all the information above and we will be talking more about this during future visits.     Thank you,   [facility] Bariatric Team

## 2023-03-29 ENCOUNTER — VIRTUAL VISIT (OUTPATIENT)
Dept: DERMATOLOGY | Facility: CLINIC | Age: 28
End: 2023-03-29
Payer: COMMERCIAL

## 2023-03-29 DIAGNOSIS — L28.0 LICHEN SIMPLEX CHRONICUS: Primary | ICD-10-CM

## 2023-03-29 PROCEDURE — 99442 PR PHYSICIAN TELEPHONE EVALUATION 11-20 MIN: CPT | Mod: 93 | Performed by: DERMATOLOGY

## 2023-03-29 ASSESSMENT — SLEEP AND FATIGUE QUESTIONNAIRES
HOW LIKELY ARE YOU TO NOD OFF OR FALL ASLEEP WHILE LYING DOWN TO REST IN THE AFTERNOON WHEN CIRCUMSTANCES PERMIT: HIGH CHANCE OF DOZING
HOW LIKELY ARE YOU TO NOD OFF OR FALL ASLEEP WHILE SITTING QUIETLY AFTER LUNCH WITHOUT ALCOHOL: WOULD NEVER DOZE
HOW LIKELY ARE YOU TO NOD OFF OR FALL ASLEEP WHILE SITTING INACTIVE IN A PUBLIC PLACE: WOULD NEVER DOZE
HOW LIKELY ARE YOU TO NOD OFF OR FALL ASLEEP IN A CAR, WHILE STOPPED FOR A FEW MINUTES IN TRAFFIC: WOULD NEVER DOZE
HOW LIKELY ARE YOU TO NOD OFF OR FALL ASLEEP WHILE SITTING AND READING: WOULD NEVER DOZE
HOW LIKELY ARE YOU TO NOD OFF OR FALL ASLEEP WHILE WATCHING TV: WOULD NEVER DOZE
HOW LIKELY ARE YOU TO NOD OFF OR FALL ASLEEP WHEN YOU ARE A PASSENGER IN A CAR FOR AN HOUR WITHOUT A BREAK: WOULD NEVER DOZE
HOW LIKELY ARE YOU TO NOD OFF OR FALL ASLEEP WHILE SITTING AND TALKING TO SOMEONE: WOULD NEVER DOZE

## 2023-03-29 NOTE — PROGRESS NOTES
"    Leigh Roberts is a 28 year old female who is being evaluated via a phone  visit.      The patient has been notified of following:     \"This phone  visit will be conducted via a call between you and your physician/provider. We have found that certain health care needs can be provided without the need for an in-person physical exam.  This service lets us provide the care you need with a video conversation.  If a prescription is necessary we can send it directly to your pharmacy.  If lab work is needed we can place an order for that and you can then stop by our lab to have the test done at a later time.    Phone visits are billed at different rates depending on your insurance coverage.  Please reach out to your insurance provider with any questions.    If during the course of the call the physician/provider feels a phone visit is not appropriate, you will not be charged for this service.\"    Patient has given verbal consent for phone visit? Yes    How would you like to obtain your AVS? Chelsea    Leigh Roberts is a 28 year old year old female patient here today for f/u LSC.  USing elidel which has helped but still get spot if she stops cream.  Patient has no other skin complaints today.  Remainder of the HPI, Meds, PMH, Allergies, FH, and SH was reviewed in chart.      Past Medical History:   Diagnosis Date     Chemical dependency (H) 2018    meth use, quit on her own     CAMILA (generalized anxiety disorder)      Grief at loss of child     fetal demise from maternal CMV infection     Moderate recurrent major depression (H)        Past Surgical History:   Procedure Laterality Date     Labor Induction  10/17/2020    lethal fetal complicaitons from maternal CMV        Family History   Problem Relation Age of Onset     Cancer Mother      Cancer Maternal Grandfather      Cancer Paternal Grandfather         Lung       Social History     Socioeconomic History     Marital status: Single     Spouse name: Not on file "     Number of children: Not on file     Years of education: Not on file     Highest education level: Not on file   Occupational History     Not on file   Tobacco Use     Smoking status: Former     Years: 0.20     Types: Cigarettes     Smokeless tobacco: Never   Vaping Use     Vaping Use: Never used   Substance and Sexual Activity     Alcohol use: Yes     Comment: occ     Drug use: Not Currently     Types: Methamphetamines     Sexual activity: Not Currently   Other Topics Concern     Not on file   Social History Narrative     Not on file     Social Determinants of Health     Financial Resource Strain: Not on file   Food Insecurity: Not on file   Transportation Needs: Not on file   Physical Activity: Not on file   Stress: Not on file   Social Connections: Not on file   Intimate Partner Violence: Not on file   Housing Stability: Not on file       Outpatient Encounter Medications as of 3/29/2023   Medication Sig Dispense Refill     D-5000 125 MCG (5000 UT) tablet Take 125 mcg by mouth daily       escitalopram (LEXAPRO) 10 MG tablet Take 1 tablet (10 mg) by mouth daily With 5 mg tablet for 15 mg total 90 tablet 3     escitalopram (LEXAPRO) 5 MG tablet Take 1 tablet (5 mg) by mouth daily With 10 mg tablet for 15 mg total. 90 tablet 3     metFORMIN (GLUCOPHAGE XR) 500 MG 24 hr tablet Take 1 tablet (500 mg) by mouth 2 times daily (with meals) 180 tablet 3     pimecrolimus (ELIDEL) 1 % external cream Apply topically 2 times daily 30 g 3     valACYclovir (VALTREX) 1000 mg tablet Take 1,000 mg by mouth daily as needed       No facility-administered encounter medications on file as of 3/29/2023.             Review Of Systems  Skin: As above  Eyes: negative  Ears/Nose/Throat: negative  Respiratory: No shortness of breath, dyspnea on exertion, cough, or hemoptysis  Cardiovascular: negative  Gastrointestinal: negative  Genitourinary: negative  Musculoskeletal: negative  Neurologic: negative  Psychiatric:  negative  Hematologic/Lymphatic/Immunologic: negative  Endocrine: negative      O:   Alert & Orientedx3, Mood & Affect wnl,    General appearance normal   Alert, oriented and in no acute distress    Clearing per patient    Pulm: Breathing Normal, talking in normal sentences, no shortness of breath during conversation    Neuro/Psych: Orientation:Alert and Orientedx3 ; Mood/Affect:normal ; no anxiety or depression     A/P:  1.LSC clearing per patient   Cont elidel   Emollient use discussed with patient   aquaphor daily   It was a pleasure speaking to Leigh Roberts today.  Previous clinic  notes and pertinent laboratory tests were reviewed prior to Leigh Roberts's visit.  Skin care regimen reviewed with patient: Eliminate harsh soaps, i.e. Dial, zest, irsih spring; Mild soaps such as Cetaphil or Dove sensitive skin, avoid hot or cold showers, aggressive use of emollients including vanicream, cetaphil or cerave discussed with patient.    Return to clinic 2 months    Teledermatology information:  - Location of patient: home  - Location of teledermatologist: [facility]   - Reason teledermatology is appropriate: of National Emergency Regarding Coronavirus disease (COVID 19) Outbreak  - The patient's condition can safely be assessed using telemedicine: yes  - Method of transmission: store and forward teledermatology  - In-person dermatology visit recommendation: no  - Service start time:855am/pm  - Service end time:910am/pm  - Date of report: 03/29/23

## 2023-03-29 NOTE — LETTER
"    3/29/2023         RE: Leigh Roberts  [address]  [address]  [place] [address]        Dear Colleague,    Thank you for referring your patient, Leigh Roberts, to the [facility]. Please see a copy of my visit note below.        Leigh Roberts is a 28 year old female who is being evaluated via a phone  visit.      The patient has been notified of following:     \"This phone  visit will be conducted via a call between you and your physician/provider. We have found that certain health care needs can be provided without the need for an in-person physical exam.  This service lets us provide the care you need with a video conversation.  If a prescription is necessary we can send it directly to your pharmacy.  If lab work is needed we can place an order for that and you can then stop by our lab to have the test done at a later time.    Phone visits are billed at different rates depending on your insurance coverage.  Please reach out to your insurance provider with any questions.    If during the course of the call the physician/provider feels a phone visit is not appropriate, you will not be charged for this service.\"    Patient has given verbal consent for phone visit? Yes    How would you like to obtain your AVS? [place]    Leigh Roberts is a 28 year old year old female patient here today for f/u C.  USing elidel which has helped but still get spot if she stops cream.  Patient has no other skin complaints today.  Remainder of the HPI, Meds, PMH, Allergies, FH, and SH was reviewed in chart.      Past Medical History:   Diagnosis Date     Chemical dependency (H) 2018    meth use, quit on her own     CAMILA (generalized anxiety disorder)      Grief at loss of child     fetal demise from maternal CMV infection     Moderate recurrent major depression (H)        Past Surgical History:   Procedure Laterality Date     Labor Induction  10/17/2020    lethal fetal complicaitons from " maternal CMV        Family History   Problem Relation Age of Onset     Cancer Mother      Cancer Maternal Grandfather      Cancer Paternal Grandfather         Lung       Social History     Socioeconomic History     Marital status: Single     Spouse name: Not on file     Number of children: Not on file     Years of education: Not on file     Highest education level: Not on file   Occupational History     Not on file   Tobacco Use     Smoking status: Former     Years: 0.20     Types: Cigarettes     Smokeless tobacco: Never   Vaping Use     Vaping Use: Never used   Substance and Sexual Activity     Alcohol use: Yes     Comment: occ     Drug use: Not Currently     Types: Methamphetamines     Sexual activity: Not Currently   Other Topics Concern     Not on file   Social History Narrative     Not on file     Social Determinants of Health     Financial Resource Strain: Not on file   Food Insecurity: Not on file   Transportation Needs: Not on file   Physical Activity: Not on file   Stress: Not on file   Social Connections: Not on file   Intimate Partner Violence: Not on file   Housing Stability: Not on file       Outpatient Encounter Medications as of 3/29/2023   Medication Sig Dispense Refill     D-5000 125 MCG (5000 UT) tablet Take 125 mcg by mouth daily       escitalopram (LEXAPRO) 10 MG tablet Take 1 tablet (10 mg) by mouth daily With 5 mg tablet for 15 mg total 90 tablet 3     escitalopram (LEXAPRO) 5 MG tablet Take 1 tablet (5 mg) by mouth daily With 10 mg tablet for 15 mg total. 90 tablet 3     metFORMIN (GLUCOPHAGE XR) 500 MG 24 hr tablet Take 1 tablet (500 mg) by mouth 2 times daily (with meals) 180 tablet 3     pimecrolimus (ELIDEL) 1 % external cream Apply topically 2 times daily 30 g 3     valACYclovir (VALTREX) 1000 mg tablet Take 1,000 mg by mouth daily as needed       No facility-administered encounter medications on file as of 3/29/2023.             Review Of Systems  Skin: As above  Eyes:  negative  Ears/Nose/Throat: negative  Respiratory: No shortness of breath, dyspnea on exertion, cough, or hemoptysis  Cardiovascular: negative  Gastrointestinal: negative  Genitourinary: negative  Musculoskeletal: negative  Neurologic: negative  Psychiatric: negative  Hematologic/Lymphatic/Immunologic: negative  Endocrine: negative      O:   Alert & Orientedx3, Mood & Affect wnl,    General appearance normal   Alert, oriented and in no acute distress    Clearing per patient    Pulm: Breathing Normal, talking in normal sentences, no shortness of breath during conversation    Neuro/Psych: Orientation:Alert and Orientedx3 ; Mood/Affect:normal ; no anxiety or depression     A/P:  1.LSC clearing per patient   Cont elidel   Emollient use discussed with patient   aquaphor daily   It was a pleasure speaking to Leigh Roberts today.  Previous clinic  notes and pertinent laboratory tests were reviewed prior to Leigh Roberts's visit.  Skin care regimen reviewed with patient: Eliminate harsh soaps, i.e. Dial, zest, irsih spring; Mild soaps such as Cetaphil or Dove sensitive skin, avoid hot or cold showers, aggressive use of emollients including vanicream, cetaphil or cerave discussed with patient.    Return to clinic 2 months    Teledermatology information:  - Location of patient: home  - Location of teledermatologist: [facility]   - Reason teledermatology is appropriate: of National Emergency Regarding Coronavirus disease (COVID 19) Outbreak  - The patient's condition can safely be assessed using telemedicine: yes  - Method of transmission: store and forward teledermatology  - In-person dermatology visit recommendation: no  - Service start time:855am/pm  - Service end time:910am/pm  - Date of report: 03/29/23        Again, thank you for allowing me to participate in the care of your patient.        Sincerely,        Chemo Franco MD

## 2023-03-30 ENCOUNTER — TELEPHONE (OUTPATIENT)
Dept: SURGERY | Facility: CLINIC | Age: 28
End: 2023-03-30

## 2023-03-30 ENCOUNTER — OFFICE VISIT (OUTPATIENT)
Dept: SURGERY | Facility: CLINIC | Age: 28
End: 2023-03-30
Attending: NURSE PRACTITIONER
Payer: COMMERCIAL

## 2023-03-30 ENCOUNTER — LAB (OUTPATIENT)
Dept: LAB | Facility: CLINIC | Age: 28
End: 2023-03-30
Payer: COMMERCIAL

## 2023-03-30 VITALS
DIASTOLIC BLOOD PRESSURE: 76 MMHG | BODY MASS INDEX: 40.8 KG/M2 | SYSTOLIC BLOOD PRESSURE: 112 MMHG | WEIGHT: 275.5 LBS | HEIGHT: 69 IN

## 2023-03-30 DIAGNOSIS — E66.01 MORBID OBESITY (H): Primary | ICD-10-CM

## 2023-03-30 DIAGNOSIS — F41.9 ANXIETY: ICD-10-CM

## 2023-03-30 DIAGNOSIS — F90.2 ATTENTION DEFICIT HYPERACTIVITY DISORDER (ADHD), COMBINED TYPE: ICD-10-CM

## 2023-03-30 DIAGNOSIS — F33.41 RECURRENT MAJOR DEPRESSIVE DISORDER, IN PARTIAL REMISSION (H): ICD-10-CM

## 2023-03-30 DIAGNOSIS — E66.01 MORBID OBESITY (H): ICD-10-CM

## 2023-03-30 LAB
ALBUMIN SERPL BCG-MCNC: 4.6 G/DL (ref 3.5–5.2)
ALP SERPL-CCNC: 81 U/L (ref 35–104)
ALT SERPL W P-5'-P-CCNC: 29 U/L (ref 10–35)
ANION GAP SERPL CALCULATED.3IONS-SCNC: 12 MMOL/L (ref 7–15)
AST SERPL W P-5'-P-CCNC: 30 U/L (ref 10–35)
BILIRUB SERPL-MCNC: 0.4 MG/DL
BUN SERPL-MCNC: 17.4 MG/DL (ref 6–20)
CALCIUM SERPL-MCNC: 9.6 MG/DL (ref 8.6–10)
CHLORIDE SERPL-SCNC: 101 MMOL/L (ref 98–107)
CREAT SERPL-MCNC: 0.82 MG/DL (ref 0.51–0.95)
DEPRECATED HCO3 PLAS-SCNC: 24 MMOL/L (ref 22–29)
ERYTHROCYTE [DISTWIDTH] IN BLOOD BY AUTOMATED COUNT: 11.8 % (ref 10–15)
FOLATE SERPL-MCNC: 11.1 NG/ML (ref 4.6–34.8)
GFR SERPL CREATININE-BSD FRML MDRD: >90 ML/MIN/1.73M2
GLUCOSE SERPL-MCNC: 94 MG/DL (ref 70–99)
HBA1C MFR BLD: 5.2 % (ref 0–5.6)
HCT VFR BLD AUTO: 42.9 % (ref 35–47)
HDLC SERPL-MCNC: 43 MG/DL
HGB BLD-MCNC: 14.9 G/DL (ref 11.7–15.7)
LDLC SERPL DIRECT ASSAY-MCNC: 115 MG/DL
MCH RBC QN AUTO: 28.3 PG (ref 26.5–33)
MCHC RBC AUTO-ENTMCNC: 34.7 G/DL (ref 31.5–36.5)
MCV RBC AUTO: 82 FL (ref 78–100)
PLATELET # BLD AUTO: 297 10E3/UL (ref 150–450)
POTASSIUM SERPL-SCNC: 4.2 MMOL/L (ref 3.4–5.3)
PROT SERPL-MCNC: 8 G/DL (ref 6.4–8.3)
PTH-INTACT SERPL-MCNC: 49 PG/ML (ref 15–65)
RBC # BLD AUTO: 5.26 10E6/UL (ref 3.8–5.2)
SODIUM SERPL-SCNC: 137 MMOL/L (ref 136–145)
TSH SERPL DL<=0.005 MIU/L-ACNC: 3.23 UIU/ML (ref 0.3–4.2)
VIT B12 SERPL-MCNC: 478 PG/ML (ref 232–1245)
WBC # BLD AUTO: 5.9 10E3/UL (ref 4–11)

## 2023-03-30 PROCEDURE — 83721 ASSAY OF BLOOD LIPOPROTEIN: CPT

## 2023-03-30 PROCEDURE — 82746 ASSAY OF FOLIC ACID SERUM: CPT

## 2023-03-30 PROCEDURE — 84630 ASSAY OF ZINC: CPT | Mod: 90

## 2023-03-30 PROCEDURE — 84590 ASSAY OF VITAMIN A: CPT | Mod: 90

## 2023-03-30 PROCEDURE — 82306 VITAMIN D 25 HYDROXY: CPT

## 2023-03-30 PROCEDURE — 36415 COLL VENOUS BLD VENIPUNCTURE: CPT

## 2023-03-30 PROCEDURE — 84425 ASSAY OF VITAMIN B-1: CPT | Mod: 90

## 2023-03-30 PROCEDURE — 80053 COMPREHEN METABOLIC PANEL: CPT

## 2023-03-30 PROCEDURE — 82607 VITAMIN B-12: CPT

## 2023-03-30 PROCEDURE — 82728 ASSAY OF FERRITIN: CPT

## 2023-03-30 PROCEDURE — 85027 COMPLETE CBC AUTOMATED: CPT

## 2023-03-30 PROCEDURE — 99215 OFFICE O/P EST HI 40 MIN: CPT | Performed by: FAMILY MEDICINE

## 2023-03-30 PROCEDURE — 99000 SPECIMEN HANDLING OFFICE-LAB: CPT

## 2023-03-30 PROCEDURE — 83036 HEMOGLOBIN GLYCOSYLATED A1C: CPT

## 2023-03-30 PROCEDURE — 84443 ASSAY THYROID STIM HORMONE: CPT

## 2023-03-30 PROCEDURE — 83718 ASSAY OF LIPOPROTEIN: CPT

## 2023-03-30 PROCEDURE — 83970 ASSAY OF PARATHORMONE: CPT

## 2023-03-30 RX ORDER — SEMAGLUTIDE 1 MG/.5ML
1 INJECTION, SOLUTION SUBCUTANEOUS WEEKLY
Qty: 2 ML | Refills: 0 | Status: SHIPPED | OUTPATIENT
Start: 2023-03-30 | End: 2023-11-22

## 2023-03-30 RX ORDER — SEMAGLUTIDE 0.25 MG/.5ML
0.25 INJECTION, SOLUTION SUBCUTANEOUS WEEKLY
Qty: 2 ML | Refills: 0 | Status: SHIPPED | OUTPATIENT
Start: 2023-03-30 | End: 2023-11-22

## 2023-03-30 RX ORDER — SEMAGLUTIDE 0.5 MG/.5ML
0.5 INJECTION, SOLUTION SUBCUTANEOUS WEEKLY
Qty: 2 ML | Refills: 0 | Status: SHIPPED | OUTPATIENT
Start: 2023-03-30 | End: 2023-11-22

## 2023-03-30 NOTE — LETTER
"    3/30/2023         RE: Leigh Roberts  [address]  [address]  [facility] [address]        Dear Colleague,    Thank you for referring your patient, Leigh Roberts, to the [facility] [place]. Please see a copy of my visit note below.    New Bariatric Surgery Consultation Note    2023    RE: Leigh Roberts  MR#: [id number]  : 1995      Referring provider: Dr. Rios  Chief Complaint/Reason for visit: evaluation for possible weight loss surgery    Dear Dr. Rios,    I had the pleasure of seeing your patient, Leigh Roberts, to evaluate her obesity and consider her for possible weight loss surgery. As you know, Leigh Roberts is 28 year old.  She has a height of 5' 9\", a weight of 275 lbs 8 oz, and calculated Body mass index is 40.68 kg/m .        HISTORY OF PRESENT ILLNESS:  Weight Loss History Reviewed with Patient 3/29/2023   How long have you been overweight? Since late teens through early 20's   What is the most that you have ever weighed? 280   What is the most weight you have lost? 100   I have tried the following methods to lose weight Watching portions or calories, Exercise   I have tried the following weight loss medications? (Check all that apply) Metformin       CO-MORBIDITIES OF OBESITY INCLUDE:     3/29/2023   I have the following health issues associated with obesity: Pre-Diabetes, Polycystic Ovarian Syndrome       PAST MEDICAL HISTORY:  Past Medical History:   Diagnosis Date     Bone and joint disord back, pelvis, leg complicat preg, childb, puerp      Chemical dependency (H) 2018    meth use, quit on her own     Depressive disorder      CAMILA (generalized anxiety disorder)      Grief at loss of child     fetal demise from maternal CMV infection     History of diabetes mellitus      Hx MRSA infection     left arm     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     Moderate recurrent major " depression (H)      STD (sexually transmitted disease) 2020     Urinary incontinence 2020     Urinary tract infection 2018       PAST SURGICAL HISTORY:  Past Surgical History:   Procedure Laterality Date     ABDOMEN SURGERY      Ago of [age]     Labor Induction  10/17/2020    lethal fetal complicaitons from maternal CMV       FAMILY HISTORY:   Family History   Problem Relation Age of Onset     Cancer Mother      Breast Cancer Mother      Depression Mother      Anxiety Disorder Mother      Mental Illness Mother      Substance Abuse Mother      Thyroid Disease Mother      Other Cancer Maternal Grandmother      Ovarian Cancer Maternal Grandmother      Cancer Maternal Grandfather      Other Cancer Maternal Grandfather      Cancer Paternal Grandfather         Lung       SOCIAL HISTORY:   Social History Questions Reviewed With Patient 3/29/2023   Which best describes your employment status (select all that apply) I work full-time, I work nights, I work alternate shifts, I am a student   If you work, what is your occupation?    Which best describes your marital status: single   Who do you have in your support network that can be available to help you for the first 2 weeks after surgery? Family   Who can you count on for support throughout your weight loss surgery journey? Family friends       HABITS:     3/29/2023   How often do you drink alcohol? 2-3 times a week   If you do drink alcohol, how many drinks might you have in a day? (one drink = 5 oz. wine, 1 can/bottle of beer, 1 shot liquor) 1 or 2   Do you currently use any of the following Nicotine products? No   Have you ever used any of the following nicotine products? No       PSYCHOLOGICAL HISTORY:   Psychological History Reviewed With Patient 3/29/2023   Have you ever attempted suicide? Never.   Have you ever been hospitalized for mental illness or a suicide attempt? More than 10 years ago.   Are you currently being treated for any of the following?  (select all that apply) Depression, Anxiety, ADHD       ROS:     3/29/2023   Skin: Leg swelling   HEENT: Headaches   Musculoskeletal: Joint Pain, Back pain, Swelling of legs   Cardiovascular: None of the above   Pulmonary: Excessively sleep during the day   Gastrointestinal: None of the above   Genitourinary: None of the above   Hematological: None of the above   Neurological: None of the above   Female only: Loss of menstrual cycles, Irregular menstrual cycles, Polycystic ovarian syndrome (PCOS)       EATING BEHAVIORS:  No flowsheet data found.    EXERCISE:     3/29/2023   How often do you exercise? Less than 1 time per week   What is the duration of your exercise (in minutes)? 45 Minutes   What types of exercise do you do? walking, climbing stairs at work   What keeps you from being more active? Pain, Shortness of breath, Lack of Time, Too tired       MEDICATIONS:  Current Outpatient Medications   Medication Sig Dispense Refill     D-5000 125 MCG (5000 UT) tablet Take 125 mcg by mouth daily       escitalopram (LEXAPRO) 10 MG tablet Take 1 tablet (10 mg) by mouth daily With 5 mg tablet for 15 mg total 90 tablet 3     escitalopram (LEXAPRO) 5 MG tablet Take 1 tablet (5 mg) by mouth daily With 10 mg tablet for 15 mg total. 90 tablet 3     metFORMIN (GLUCOPHAGE XR) 500 MG 24 hr tablet Take 1 tablet (500 mg) by mouth 2 times daily (with meals) 180 tablet 3     pimecrolimus (ELIDEL) 1 % external cream Apply topically 2 times daily 30 g 3     Semaglutide-Weight Management (WEGOVY) 0.25 MG/0.5ML pen Inject 0.25 mg Subcutaneous once a week for 28 days 2 mL 0     Semaglutide-Weight Management (WEGOVY) 0.5 MG/0.5ML pen Inject 0.5 mg Subcutaneous once a week for 28 days 2 mL 0     Semaglutide-Weight Management (WEGOVY) 1 MG/0.5ML pen Inject 1 mg Subcutaneous once a week for 28 days 2 mL 0     valACYclovir (VALTREX) 1000 mg tablet Take 1,000 mg by mouth daily as needed         ALLERGIES:  Allergies   Allergen Reactions      "Bactrim [Sulfamethoxazole W/Trimethoprim]        LABS/IMAGING/MEDICAL RECORDS REVIEWED    PHYSICAL EXAM:  /76   Ht 1.753 m (5' 9\")   Wt 125 kg (275 lb 8 oz)   BMI 40.68 kg/m    Neck 17.5\" Mallampati 3+  Heart regular  Lungs clear  Abdominal circumference 46.5\"  Extremities no SHAHEEN, Pulses 2+  Euthymic  Alert and oriented  Gait normal    In summary,Bess has PCOS, hip and back pain, depression and anxiety, in the setting of morbid obesity. Her Body mass index is 40.68 kg/m . This satisfies NIH criteria for bariatric surgery. Once Bess has been cleared by our bariatric psychologist and our bariatric dietitian, completed initial lab work, attended one support group, and satisfied insurance mandated visits if applicable, she  will be scheduled with  Dr. Jensen for Bariatric Surgery Consultation. She is interested in the  RYGB.  Bess understands that routine health care maintenance will need to be up to date prior to surgery. she verbalizes understanding of the process to surgery and expectations for the postoperative period including the need for lifelong lifestyle changes, vitamin supplementation, and laboratory monitoring.       Weight will need to be 275# prior to submitting for insurance approval.  Standard DVT protocol  Ursodiol for 6 months after surgery to prevent gallstone formation  Sleep Study was ordered d/t neck 17.5, BMI 40, fatigue and snoring  MRSA swabs for hx MRSA left arm  Bess was reminded to prevent pregnancy for 18-24 months post operatively.   She will participate in the 24 week program  Wegovy for pre operative weight loss.           Sincerely,          Shreya Holguin MD     Total time spent on the date of this encounter doing: chart review, review of test results, patient visit, physical exam, education, counseling, developing plan of care, and documenting = 45 minutes.         Again, thank you for allowing me to participate in the care of your patient.  "       Sincerely,        Shreya Holguin MD

## 2023-03-30 NOTE — PROGRESS NOTES
"New Bariatric Surgery Consultation Note    2023    RE: Leigh Roberts  MR#: [id number]  : 1995      Referring provider: Dr. Rios  Chief Complaint/Reason for visit: evaluation for possible weight loss surgery    Dear Dr. Rios,    I had the pleasure of seeing your patient, Leigh Roberts, to evaluate her obesity and consider her for possible weight loss surgery. As you know, Leigh Roberts is 28 year old.  She has a height of 5' 9\", a weight of 275 lbs 8 oz, and calculated Body mass index is 40.68 kg/m .        HISTORY OF PRESENT ILLNESS:  Weight Loss History Reviewed with Patient 3/29/2023   How long have you been overweight? Since late teens through early 20's   What is the most that you have ever weighed? 280   What is the most weight you have lost? 100   I have tried the following methods to lose weight Watching portions or calories, Exercise   I have tried the following weight loss medications? (Check all that apply) Metformin       CO-MORBIDITIES OF OBESITY INCLUDE:     3/29/2023   I have the following health issues associated with obesity: Pre-Diabetes, Polycystic Ovarian Syndrome       PAST MEDICAL HISTORY:  Past Medical History:   Diagnosis Date     Bone and joint disord back, pelvis, leg complicat preg, childb, puerp      Chemical dependency (H) 2018    meth use, quit on her own     Depressive disorder      CAMILA (generalized anxiety disorder)      Grief at loss of child     fetal demise from maternal CMV infection     History of diabetes mellitus      Hx MRSA infection     left arm     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     Moderate recurrent major depression (H)      STD (sexually transmitted disease) 2020     Urinary incontinence 2020     Urinary tract infection 2018       PAST SURGICAL HISTORY:  Past Surgical History:   Procedure Laterality Date     ABDOMEN SURGERY      Ago of [age]     Labor Induction  10/17/2020    lethal fetal " complicaitons from maternal CMV       FAMILY HISTORY:   Family History   Problem Relation Age of Onset     Cancer Mother      Breast Cancer Mother      Depression Mother      Anxiety Disorder Mother      Mental Illness Mother      Substance Abuse Mother      Thyroid Disease Mother      Other Cancer Maternal Grandmother      Ovarian Cancer Maternal Grandmother      Cancer Maternal Grandfather      Other Cancer Maternal Grandfather      Cancer Paternal Grandfather         Lung       SOCIAL HISTORY:   Social History Questions Reviewed With Patient 3/29/2023   Which best describes your employment status (select all that apply) I work full-time, I work nights, I work alternate shifts, I am a student   If you work, what is your occupation?    Which best describes your marital status: single   Who do you have in your support network that can be available to help you for the first 2 weeks after surgery? Family   Who can you count on for support throughout your weight loss surgery journey? Family friends       HABITS:     3/29/2023   How often do you drink alcohol? 2-3 times a week   If you do drink alcohol, how many drinks might you have in a day? (one drink = 5 oz. wine, 1 can/bottle of beer, 1 shot liquor) 1 or 2   Do you currently use any of the following Nicotine products? No   Have you ever used any of the following nicotine products? No       PSYCHOLOGICAL HISTORY:   Psychological History Reviewed With Patient 3/29/2023   Have you ever attempted suicide? Never.   Have you ever been hospitalized for mental illness or a suicide attempt? More than 10 years ago.   Are you currently being treated for any of the following? (select all that apply) Depression, Anxiety, ADHD       ROS:     3/29/2023   Skin: Leg swelling   HEENT: Headaches   Musculoskeletal: Joint Pain, Back pain, Swelling of legs   Cardiovascular: None of the above   Pulmonary: Excessively sleep during the day   Gastrointestinal: None of the  "above   Genitourinary: None of the above   Hematological: None of the above   Neurological: None of the above   Female only: Loss of menstrual cycles, Irregular menstrual cycles, Polycystic ovarian syndrome (PCOS)       EATING BEHAVIORS:  No flowsheet data found.    EXERCISE:     3/29/2023   How often do you exercise? Less than 1 time per week   What is the duration of your exercise (in minutes)? 45 Minutes   What types of exercise do you do? walking, climbing stairs at work   What keeps you from being more active? Pain, Shortness of breath, Lack of Time, Too tired       MEDICATIONS:  Current Outpatient Medications   Medication Sig Dispense Refill     D-5000 125 MCG (5000 UT) tablet Take 125 mcg by mouth daily       escitalopram (LEXAPRO) 10 MG tablet Take 1 tablet (10 mg) by mouth daily With 5 mg tablet for 15 mg total 90 tablet 3     escitalopram (LEXAPRO) 5 MG tablet Take 1 tablet (5 mg) by mouth daily With 10 mg tablet for 15 mg total. 90 tablet 3     metFORMIN (GLUCOPHAGE XR) 500 MG 24 hr tablet Take 1 tablet (500 mg) by mouth 2 times daily (with meals) 180 tablet 3     pimecrolimus (ELIDEL) 1 % external cream Apply topically 2 times daily 30 g 3     Semaglutide-Weight Management (WEGOVY) 0.25 MG/0.5ML pen Inject 0.25 mg Subcutaneous once a week for 28 days 2 mL 0     Semaglutide-Weight Management (WEGOVY) 0.5 MG/0.5ML pen Inject 0.5 mg Subcutaneous once a week for 28 days 2 mL 0     Semaglutide-Weight Management (WEGOVY) 1 MG/0.5ML pen Inject 1 mg Subcutaneous once a week for 28 days 2 mL 0     valACYclovir (VALTREX) 1000 mg tablet Take 1,000 mg by mouth daily as needed         ALLERGIES:  Allergies   Allergen Reactions     Bactrim [Sulfamethoxazole W/Trimethoprim]        LABS/IMAGING/MEDICAL RECORDS REVIEWED    PHYSICAL EXAM:  /76   Ht 1.753 m (5' 9\")   Wt 125 kg (275 lb 8 oz)   BMI 40.68 kg/m    Neck 17.5\" Mallampati 3+  Heart regular  Lungs clear  Abdominal circumference 46.5\"  Extremities no SHAHEEN, " Pulses 2+  Euthymic  Alert and oriented  Gait normal    In summary,Bess has PCOS, hip and back pain, depression and anxiety, in the setting of morbid obesity. Her Body mass index is 40.68 kg/m . This satisfies NIH criteria for bariatric surgery. Once eBss has been cleared by our bariatric psychologist and our bariatric dietitian, completed initial lab work, attended one support group, and satisfied insurance mandated visits if applicable, she  will be scheduled with  Dr. Jensen for Bariatric Surgery Consultation. She is interested in the  RYGB.  Bess understands that routine health care maintenance will need to be up to date prior to surgery. she verbalizes understanding of the process to surgery and expectations for the postoperative period including the need for lifelong lifestyle changes, vitamin supplementation, and laboratory monitoring.       Weight will need to be 275# prior to submitting for insurance approval.  Standard DVT protocol  Ursodiol for 6 months after surgery to prevent gallstone formation  Sleep Study was ordered d/t neck 17.5, BMI 40, fatigue and snoring  MRSA swabs for hx MRSA left arm  Bess was reminded to prevent pregnancy for 18-24 months post operatively.   She will participate in the 24 week program  Wegovy for pre operative weight loss.           Sincerely,          Shreya Holguin MD     Total time spent on the date of this encounter doing: chart review, review of test results, patient visit, physical exam, education, counseling, developing plan of care, and documenting = 45 minutes.

## 2023-03-30 NOTE — TELEPHONE ENCOUNTER
Pt states pharmacy told her a PA is needed for Wegovy. Please send myc message once PA rec'd back.

## 2023-03-31 ENCOUNTER — TELEPHONE (OUTPATIENT)
Dept: SURGERY | Facility: CLINIC | Age: 28
End: 2023-03-31
Payer: COMMERCIAL

## 2023-03-31 LAB
DEPRECATED CALCIDIOL+CALCIFEROL SERPL-MC: 36 UG/L (ref 20–75)
FERRITIN SERPL-MCNC: 39 NG/ML (ref 6–175)

## 2023-03-31 NOTE — TELEPHONE ENCOUNTER
Called pharmacy to discuss and they will be starting a PA and sending it to the PA team.    Catalina Torres RN, CBN  [facility]  P [phone]  F [phone]

## 2023-03-31 NOTE — TELEPHONE ENCOUNTER
Prior Authorization Retail Medication Request    Medication/Dose: Wegovy 0.25mg/0.5ml   ICD code (if different than what is on RX):    Previously Tried and Failed:    Rationale:  PA required through both INS     Insurance Name:  [employer]   Insurance ID:  [id number]    Secondary Insurance Name: [facility]  Secondary Insurance ID: [id number]      Pharmacy Information (if different than what is on RX)  Name:    Phone:       Thank you,  Leela [facility] Pharmacy Technician Rashida

## 2023-04-02 LAB — ZINC SERPL-MCNC: 83.9 UG/DL

## 2023-04-02 NOTE — TELEPHONE ENCOUNTER
PA Initiation    Medication: Semaglutide-Weight Management (WEGOVY) 0.25 MG/0.5ML pen   Insurance Company: [employer] - Phone [phone] Fax [phone]  Pharmacy Filling the Rx: [facility] - [place], [place] - [address]  Filling Pharmacy Phone: [phone]  Filling Pharmacy Fax: [phone]  Start Date: 4/2/2023

## 2023-04-03 ENCOUNTER — TELEPHONE (OUTPATIENT)
Dept: SURGERY | Facility: CLINIC | Age: 28
End: 2023-04-03
Payer: COMMERCIAL

## 2023-04-03 LAB
ANNOTATION COMMENT IMP: NORMAL
RETINYL PALMITATE SERPL-MCNC: <0.02 MG/L
VIT A SERPL-MCNC: 0.44 MG/L

## 2023-04-03 NOTE — TELEPHONE ENCOUNTER
Prior Authorization Approval    Authorization Effective Date: 4/2/2023  Authorization Expiration Date: 8/30/2023  Medication: Semaglutide-Weight Management (WEGOVY) 0.25 MG/0.5ML pen--APPROVED  Approved Dose/Quantity:   Reference #:     Insurance Company: [employer] - Phone [phone] Fax [phone]  Expected CoPay:       CoPay Card Available:      Foundation Assistance Needed:    Which Pharmacy is filling the prescription (Not needed for infusion/clinic administered): [facility], [place] - [address] [place] [address]  Pharmacy Notified: Yes  Patient Notified: Yes **Instructed pharmacy to notify patient when script is ready to /ship.**

## 2023-04-04 ENCOUNTER — VIRTUAL VISIT (OUTPATIENT)
Dept: SURGERY | Facility: CLINIC | Age: 28
End: 2023-04-04

## 2023-04-04 DIAGNOSIS — E66.9 OBESITY: Primary | ICD-10-CM

## 2023-04-04 PROCEDURE — 99207 PR MEDICAL WEIGHT MANAGEMENT PROGRAM EMPLOYEE ENROLLMENT: CPT | Mod: VID

## 2023-04-04 NOTE — PROGRESS NOTES
Reason for Visit: 24-Week Healthy Lifestyle Plan Initial Visit - Health Coaching Virtual Visit    Progress Notes:  New 24-Week Healthy Lifestyle Plan Weight Management Health Coaching Note - Virtual Visit    Leigh Roberts   MRN: [id number]  : 1995  MIGUEL: 2023    Initial Health  Visit #1  [facility]  Location: off-site/home office    Assessment:  Initial Start Date: 23  Graduation Date:  2023  Next[employer] (online resource) enrollment date(s): 2023  Physician:  Dr. Holguin  Referred by: self/PCP  Initial Weight (lbs): 278 lbs. (reported with Dr. Holguin on 3/30/23)  Current Weight (lbs): 278 lbs.  Average Daily Water (ounces): - oz/day  Weight Loss Medication: Wegovy (joined a support group on [employer])  Nutrition Plan: real whole foods    PATIENT INFORMATION PROVIDED via email sent by Health :  1.) 24 Week Healthy Lifestyle Plan Overview:  Explained the structure of the 24-week plan, reviewed scheduling information including the main scheduling phone number [phone], discussed all coaching sessions will be done via phone.  2.) Next[employer] - Online resource available to patient on the 1st day of the month following start date with Health .  Patient will receive a Welcome email from [employer] with their user name and password.  Patient will have full access to [employer] for a duration of 7 months.  This online resource will be continued if patient purchases the 24-week Extension which includes 3, 30-min. Health and Wellness Coaching appointments.  3.) Support Group monthly topics, dates/times - Flyer with more information provided by the Health  (see end of note for the current list)  4.) Explain the role of a Health  & the FOUR Pillars of Health (Nutrition, Exercise/Activity/Movement, Sleep and Stress Management)      INITIAL INTAKE: will complete at visit #2  The four pillars of well-being may impact your ability to manage weight.   Level  "of Satisfaction:  Rate your current level of satisfaction on a scale of 1-10 in each pillar.     Nutrition (1 -10):     Movement/Activity (1 -10):     Sleep (1 -10):     Stress Management (1 -10):       WELLNESS VISION: 5 minute Visioning Exercise (may be completed at visit #2/#3)    You may choose to close your eyes for this exercise. Start with three full breaths to bring your attention inward.    In your mind s eye, see yourself in the near future. You are your healthiest, happiest, and most true version of yourself. What do you see? What do you notice?     Invite patient to expand on this vision, and/or start  trying on  this vision this week.      NOTES:  Living in [place], [place], single, 2 cats  Currently in school, working on getting her Associate Degree and looking to get bachelor's in Criminal Justice ([employer] in person)  Saturdays thru Tuesdays, overnights working full-time at [place], [place]  Grew up in [place], foster home - 8 yrs old and her birth mom relinquished her parental rights - no contact with her birth mom  16 yrs old moved to living on her own in a home  Has an older sister (10 yrs older) lives in [place]. Going to visit her in May  Step-Dad \"Dad\" and his family living in [place]  Past history of drug use, 1 relapse in early 20's  Has worked with a therapist and done anger management classes  Lost a son in 2020  Anxiety meds as she learned she does experience anxiety after her loss  Looking to learn easy cooking meals that are healthier and that she likes/enjoys  Portion control  Eating in moderation and making mindful choices  Does not want to stay on medication long-term.      Follow-Up appointments:   4/11 with ANDRÉS Hannon #2  4/21 with EDU King initial visit  7/6 with Dr. Ezio MD f/u      Reminder:  Monthly Healthy Lifestyle Support Groups offered virtually via TEAMS.  Contact Danita Davidson [email or web address]) to be added to the invite list.  Upcoming:  (Fridays, 12:30pm to " "1:30pm):    : Guest Speaker, Evette Bermudez RD, Registered Dietitian, \"Heart Health\"  May 19: \"Let's Talk\" a time for the group to share  : \"Coaching Tools\"  July thru December - topics coming soon...here are the tentative dates:  July 7  August 4  September 1  October 6  November 3  December 1       SIGNATURE:  DOTTIE Nicholson, [facility]  National Board-Certified Health &   24 Week Healthy Lifestyle Program Health   [facility]  email:  [email or web address]  appointment [phone]  "

## 2023-04-04 NOTE — LETTER
2023         RE: Leigh Roberts  [address]  [address]  [facility] [address]        Dear Colleague,    Thank you for referring your patient, Leigh Roberts, to the [facility] SURGERY CLINIC AND BARIATRICS CARE [place]. Please see a copy of my visit note below.    Reason for Visit: 24-Week Healthy Lifestyle Plan Initial Visit - Health Coaching Virtual Visit    Progress Notes:  New 24-Week Healthy Lifestyle Plan Weight Management Health Coaching Note - Virtual Visit    Leigh Roberts   MRN: [id number]  : 1995  MIGUEL: 2023    Initial Health  Visit #1  Provider: OLESYA Nicholson-[facility]  Location: off-site/home office    Assessment:  Initial Start Date: 23  Graduation Date:  2023  Pouring [employer] (online resource) enrollment date(s): 2023  Physician:  Dr. Holguin  Referred by: self/PCP  Initial Weight (lbs): 278 lbs. (reported with Dr. Holguin on 3/30/23)  Current Weight (lbs): 278 lbs.  Average Daily Water (ounces): - oz/day  Weight Loss Medication: Wegovy (joined a support group on Facebook)  Nutrition Plan: real whole foods    PATIENT INFORMATION PROVIDED via email sent by Health :  1.) 24 Week Healthy Lifestyle Plan Overview:  Explained the structure of the 24-week plan, reviewed scheduling information including the main scheduling phone number [phone], discussed all coaching sessions will be done via phone.  2.) [employer] - Online resource available to patient on the 1st day of the month following start date with Health .  Patient will receive a Welcome email from [employer] with their user name and password.  Patient will have full access to [employer] for a duration of 7 months.  This online resource will be continued if patient purchases the 24-week Extension which includes 3, 30-min. Health and Wellness Coaching appointments.  3.) Support Group monthly topics, dates/times - Flyer with more information provided by the Health  (see end of  "note for the current list)  4.) Explain the role of a Health  & the FOUR Pillars of Health (Nutrition, Exercise/Activity/Movement, Sleep and Stress Management)      INITIAL INTAKE: will complete at visit #2  The four pillars of well-being may impact your ability to manage weight.   Level of Satisfaction:  Rate your current level of satisfaction on a scale of 1-10 in each pillar.     Nutrition (1 -10):     Movement/Activity (1 -10):     Sleep (1 -10):     Stress Management (1 -10):       WELLNESS VISION: 5 minute Visioning Exercise (may be completed at visit #2/#3)    You may choose to close your eyes for this exercise. Start with three full breaths to bring your attention inward.    In your mind s eye, see yourself in the near future. You are your healthiest, happiest, and most true version of yourself. What do you see? What do you notice?     Invite patient to expand on this vision, and/or start  trying on  this vision this week.      NOTES:  Living in [place], [place], single, 2 cats  Currently in school, working on getting her Associate Degree and looking to get bachelor's in Criminal Justice ([employer] in person)  Saturdays thru Tuesdays, overnights working full-time at [place], [place]  Grew up in [place], foster home - 8 yrs old and her birth mom relinquished her parental rights - no contact with her birth mom  16 yrs old moved to living on her own in a home  Has an older sister (10 yrs older) lives in [place]. Going to visit her in May  Step-Dad \"Dad\" and his family living in [place]  Past history of drug use, 1 relapse in early 20's  Has worked with a therapist and done anger management classes  Lost a son in 2020  Anxiety meds as she learned she does experience anxiety after her loss  Looking to learn easy cooking meals that are healthier and that she likes/enjoys  Portion control  Eating in moderation and making mindful choices  Does not want to stay on medication long-term.      Follow-Up " "appointments:    with Riddhi, HC #2   with EDU King initial visit   with Dr. Ezio MD f/u      Reminder:  Monthly Healthy Lifestyle Support Groups offered virtually via TEAMS.  Contact Danita Davidson ([email or web address]) to be added to the invite list.  Upcoming:  (, 12:30pm to 1:30pm):    : Guest Speaker, Evette Bermudez RD, Registered Dietitian, \"Heart Health\"  May 19: \"Let's Talk\" a time for the group to share  : \"Coaching Tools\"  July thru December - topics coming soon...here are the tentative dates:  July 7  August 4  September 1  October 6  November 3  December 1       SIGNATURE:  DOTTIE Nicholson, [facility]  National Board-Certified Health &   24 Week Healthy Lifestyle Program Health   [place] [facility]  email:  [email or web address]  appointment [phone]      Again, thank you for allowing me to participate in the care of your patient.        Sincerely,        Riddhi Tellez    "

## 2023-04-05 LAB — VIT B1 PYROPHOSHATE BLD-SCNC: 160 NMOL/L

## 2023-04-11 ENCOUNTER — VIRTUAL VISIT (OUTPATIENT)
Dept: SURGERY | Facility: CLINIC | Age: 28
End: 2023-04-11

## 2023-04-11 DIAGNOSIS — E66.9 OBESITY: Primary | ICD-10-CM

## 2023-04-11 PROCEDURE — 99207 PR MWM HEALTH COACH NO CHARGE: CPT

## 2023-04-11 NOTE — LETTER
2023         RE: Leigh Roberts  [address]  [address]  [facility] [address]        Dear Colleague,    Thank you for referring your patient, Leigh Roberts, to the [facility] SURGERY CLINIC AND BARIATRICS CARE [place]. Please see a copy of my visit note below.    Reason for Visit: 24-Week Healthy Lifestyle Plan Follow up Visit - Health Coaching Virtual Visit    Progress Notes:  Return 24-Week Healthy Lifestyle Plan Weight Management Health Coaching Note - Virtual Visit  Leigh Roberts   MRN: [id number]  : 1995  MIGUEL: 2023    Health  Follow-up Visit #: 2  Provider: OLESYA Nicholson-[facility]  Location: off-site/home office    Assessment:  Initial Start Date: 23  Graduation Date:  2023  Fred (online resource) enrollment date(s): 2023  Physician:  Dr. Holguin  Referred by: self/PCP  Initial Weight (lbs): 278 lbs. (reported with Dr. Holguin on 3/30/23)  Current Weight (lbs): 278 lbs.  Average Daily Water (ounces): 30-40 oz/day (drinks more at work), also enjoys frappachinos from [employer], energy drinks   Weight Loss Medication: Wegovy (joined a support group on Facebook)  Nutrition Plan: real whole foods      Level of Satisfaction: (Health  Visits - midpoint & end of program):  Rate your current level of satisfaction on a scale of 1-10 in each pillar.    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 Opportunity to increase movement     Sleep (1 -10): 8    Stress Management (1 -10): no number given today      Conversation today :  Nutrition: understands fruits/veggies, low carbs, calories etc. Appt with EDU King on . Knows a lot about nutrition but doesn't stick to it. Likes sugary foods, carbs. Currently trying to reduce energy drinks during work shift.  Exercise/Movement/Activity: 3-4 enjoys swimming, walks more at work than at home. Could do more  "on days off.  Sleep: not bad but with her schedule, she can usually get 8 hrs in 24 hrs. ADHD so needs sleep to help function well. Toss and turn quite a bit.  Stress Management: used to have anger issues and internalize. Fear. Things would add up. Sometimes just hit her limit in the past. Now she has learned more ways to talk about it, not allowing it to simmer.  Other: Dad is getting older and she is really closest with him (step) Mind trying to prepare in a way.      GOALS established today 4/11 by client:  No specific goals today but talked about tracking foods and being mindful of food choices as well as moving her body on days off, like going for a quick short walk in the warmer spring weather.  (meeting with EDU King for the first visit next Friday, April 21 - will set goals then)      NOTES:  Living in [place], [place], single, 2 cats  Currently in school, working on getting her Associate Degree and looking to get bachelor's in Criminal Justice ([employer] in person)  Saturdays thru Tuesdays, overnights working full-time at [place], [place]  Grew up in [place], foster home - 8 yrs old and her birth mom relinquished her parental rights - no contact with her birth mom  16 yrs old moved to living on her own in a home  Has an older sister (10 yrs older) lives in [place]. Going to visit her in May  Step-Dad \"Dad\" and his family living in [place], really close with him  Past history of drug use, 1 relapse in early 20's  Has worked with a therapist and done anger management classes  Lost a son in 2020  Anxiety meds as she learned she does experience anxiety after her loss  Looking to learn easy cooking meals that are healthier and that she likes/enjoys  Portion control  Eating in moderation and making mindful choices  Does not want to stay on medication long-term      Follow-up appointments:  4/21 with EDU King initial visit  7/6 with Dr. Ezio MD f/u      Reminder:  Monthly Healthy Lifestyle Support Groups " "offered virtually via TEAMS.  Contact Danita Davidson ([email or web address]) to be added to the invite list.  Upcoming:  (, 12:30pm to 1:30pm):    : Guest Speaker, Evette Bermudez RD, Registered Dietitian, \"Heart Health\"  May 19: \"Let's Talk\" a time for the group to share  : \"Coaching Tools\"  July thr - topics coming soon...here are the tentative dates:  July 7  August 4  September 1  October 6  November 3  December 1       SIGNATURE:  DOTTIE Nicholson, [facility]  National Board-Certified Health &   24-Week Healthy Lifestyle Plan Health   [place] [facility]  email:  [email or web address]  appointment [phone]        Again, thank you for allowing me to participate in the care of your patient.        Sincerely,        Riddhi Tellez    "

## 2023-04-11 NOTE — PROGRESS NOTES
Reason for Visit: 24-Week Healthy Lifestyle Plan Follow up Visit - Health Coaching Virtual Visit    Progress Notes:  Return 24-Week Healthy Lifestyle Plan Weight Management Health Coaching Note - Virtual Visit  Leigh Roberts   MRN: [id number]  : 1995  MIGUEL: 2023    Health  Follow-up Visit #: 2  [facility]  Location: off-site/home office    Assessment:  Initial Start Date: 23  Graduation Date:  2023  Fred (online resource) enrollment date(s): 2023  Physician:  Dr. Holguin  Referred by: self/PCP  Initial Weight (lbs): 278 lbs. (reported with Dr. Holguin on 3/30/23)  Current Weight (lbs): 278 lbs.  Average Daily Water (ounces): 30-40 oz/day (drinks more at work), also enjoys frappachinos from [employer], energy drinks   Weight Loss Medication: Wegovy (joined a support group on Facebook)  Nutrition Plan: real whole foods      Level of Satisfaction: (Health  Visits - midpoint & end of program):  Rate your current level of satisfaction on a scale of 1-10 in each pillar.    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 Opportunity to increase movement     Sleep (1 -10): 8    Stress Management (1 -10): no number given today      Conversation today :  Nutrition: understands fruits/veggies, low carbs, calories etc. Appt with EDU King on . Knows a lot about nutrition but doesn't stick to it. Likes sugary foods, carbs. Currently trying to reduce energy drinks during work shift.  Exercise/Movement/Activity: 3-4 enjoys swimming, walks more at work than at home. Could do more on days off.  Sleep: not bad but with her schedule, she can usually get 8 hrs in 24 hrs. ADHD so needs sleep to help function well. Toss and turn quite a bit.  Stress Management: used to have anger issues and internalize. Fear. Things would add up. Sometimes just hit her limit in the past. Now  "she has learned more ways to talk about it, not allowing it to simmer.  Other: Dad is getting older and she is really closest with him (step) Mind trying to prepare in a way.      GOALS established today 4/11 by client:  No specific goals today but talked about tracking foods and being mindful of food choices as well as moving her body on days off, like going for a quick short walk in the warmer spring weather.  (meeting with EDU King for the first visit next Friday, April 21 - will set goals then)      NOTES:  Living in [place], [place], single, 2 cats  Currently in school, working on getting her Associate Degree and looking to get bachelor's in Criminal Justice ([employer] in person)  Saturdays thru Tuesdays, overnights working full-time at [place], [place]  Grew up in [place], foster home - 8 yrs old and her birth mom relinquished her parental rights - no contact with her birth mom  16 yrs old moved to living on her own in a home  Has an older sister (10 yrs older) lives in [place]. Going to visit her in May  Step-Dad \"Dad\" and his family living in [place], really close with him  Past history of drug use, 1 relapse in early 20's  Has worked with a therapist and done anger management classes  Lost a son in 2020  Anxiety meds as she learned she does experience anxiety after her loss  Looking to learn easy cooking meals that are healthier and that she likes/enjoys  Portion control  Eating in moderation and making mindful choices  Does not want to stay on medication long-term      Follow-up appointments:  4/21 with EDU King initial visit  7/6 with Dr. Ezio MD f/u      Reminder:  Monthly Healthy Lifestyle Support Groups offered virtually via TEAMS.  Contact Danita Stuart [email or web address]) to be added to the invite list.  Upcoming:  (Fridays, 12:30pm to 1:30pm):    April 14: Guest Speaker, Evette Bermudez RD, Registered Dietitian, \"Heart Health\"  May 19: \"Let's Talk\" a time for the group to share  June 9: " "\"Coaching Tools\"  July thru December - topics coming soon...here are the tentative dates:  July 7  August 4  September 1  October 6  November 3  December 1       SIGNATURE:  DOTTIE Nicholson, [facility]  National Board-Certified Health &   24-Week Healthy Lifestyle Plan Health   [place] [facility] Management [facility]  email:  [email or web address]  appointment [phone]    "

## 2023-04-21 ENCOUNTER — VIRTUAL VISIT (OUTPATIENT)
Dept: SURGERY | Facility: CLINIC | Age: 28
End: 2023-04-21
Payer: COMMERCIAL

## 2023-04-21 DIAGNOSIS — E66.01 CLASS 3 SEVERE OBESITY DUE TO EXCESS CALORIES WITHOUT SERIOUS COMORBIDITY WITH BODY MASS INDEX (BMI) OF 40.0 TO 44.9 IN ADULT (H): Primary | ICD-10-CM

## 2023-04-21 DIAGNOSIS — Z71.3 NUTRITIONAL COUNSELING: ICD-10-CM

## 2023-04-21 DIAGNOSIS — E66.813 CLASS 3 SEVERE OBESITY DUE TO EXCESS CALORIES WITHOUT SERIOUS COMORBIDITY WITH BODY MASS INDEX (BMI) OF 40.0 TO 44.9 IN ADULT (H): Primary | ICD-10-CM

## 2023-04-21 PROCEDURE — 97802 MEDICAL NUTRITION INDIV IN: CPT | Mod: VID | Performed by: DIETITIAN, REGISTERED

## 2023-04-21 NOTE — PROGRESS NOTES
Leigh Roberts is a 28 year old who is being evaluated via a billable video visit.      How would you like to obtain your AVS? MyChart  If the video visit is dropped, the invitation should be resent by: Send to e-mail at: [email or web address]  Will anyone else be joining your video visit? No          Medical Weight Loss Initial Diet Evaluation  Assessment:  This patient was referred by Dr. Holguin for MNT as treatment for Obesity which is impacting her Insulin Resistance.     Leigh is presenting today for a new weight management nutrition consultation. Pt has had an initial appointment with Dr. Holguin.    Weight loss medication: Wegovy.-noticed that portion sizes are much smaller since starting the medication      Anthropometrics:    Pt's weight is 272 lbs  Initial weight: 275 lbs  Weight change: 3 lbs (down)  BMI: There is no height or weight on file to calculate BMI.   Ideal body weight: 66.2 kg (145 lb 15.1 oz)  Adjusted ideal body weight: 89.7 kg (197 lb 12.3 oz)    Estimated RMR ([place]-St Jeor equation):  2025 kcals x 1.3 (light active) = 2633 kcals (for weight maintenance)    Recommended Protein Intake: 60-80 grams of protein/day    Medical History:  Patient Active Problem List   Diagnosis     PCOS (polycystic ovarian syndrome)     Recurrent major depressive disorder, in partial remission (H)     Anxiety     Morbid obesity (H)      Diabetes: No-Insulin Resistance (Metformin)  HbA1c:  No results found for: HGBA1C    Nutrition History:   Food allergies/intolerances/cultural or religous food customs: No however sensitivity to certain textures (cotton candy, cauliflower)    Vitamins/Mineral Supplementation: Vitamin D, Vitamin B12    Dietary Recall: (works overnight, tends to eat on the go)  Breakfast: something to snack on usually (crackers and chips) or ham sandwich or carrots and celery  Lunch: Fast Food  Dinner: bowl of cereal or eggs  Typical Snacks: chips, crackers, fruit (orange)  Overnight eating:  No  Eating out: Taco Bell, McDonalds (before medication every other day)    Beverages: Energy Drinks (working on reducing), water, V8 Juice,     Exercise: walking at work, 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       Nutrition Intervention  1. Food and/or Nutrient Delivery   a. Placed emphasis on importance of developing a healthy meal routine, aiming for 3 meals a day and no snacks.  2. Nutrition Education   a. Discussed with patient how to build a meal: the importance of including a lean/low fat protein at each meal, include a source of vegetables at a minimum of lunch and dinner and limiting carbohydrate intake to <25% per meal.  b. Educated on sources of lean protein, portion sizes, the amount of grams found in each source. Recommend patient to aim for 20-30g protein at each meal.  c. Educated on how to read a food label: keeping total fat <10g and sugar <10g per serving.  d. Discussed the importance of adequate hydration, with emphasis on drinking 64oz of water or zero calorie beverages per day.  3. Nutrition Counseling   a. Encouraged importance of developing routine exercise for health benefits and weight loss.    Goals established by patient:   1. Focus on protein first, aiming for at least 60-80 grams of protein/day.   2. Incorporate high fiber foods into meals/snacks (I.e. veggies, fruit, whole grains, etc...)  3. Work on eliminating high fat/high sugar foods/beverages.     Handouts provided:  Non Surgical Weight Loss packet.   Snack Ideas  Quick and Easy Meals  Protein Supplements    Assessment/Plan:    Pt will follow up in 3 month(s) with bariatrician and 1 month(s) with dietitian.         Video-Visit Details    Type of service:  Video Visit    Video Start Time (time video started): 12:47 pm    Video End  Time (time video stopped): 1:17 pm    Originating Location (pt. Location): Home        Distant Location (provider location):  Off-site    Mode of Communication:  Video Conference via [facility]    Physician has received verbal consent for a Video Visit from the patient? Yes      Christine Jarrett, RD

## 2023-04-21 NOTE — LETTER
4/21/2023         RE: Leigh Roberts  [address]  [address]  [facility] [address]        Dear Colleague,    Thank you for referring your patient, Leigh Roberts, to the [facility] SURGERY CLINIC AND BARIATRICS CARE [place]. Please see a copy of my visit note below.    Leigh Roberts is a 28 year old who is being evaluated via a billable video visit.      How would you like to obtain your AVS? MyChart  If the video visit is dropped, the invitation should be resent by: Send to e-mail at: [email or web address]  Will anyone else be joining your video visit? No          Medical Weight Loss Initial Diet Evaluation  Assessment:  This patient was referred by Dr. Holguin for MNT as treatment for Obesity which is impacting her Insulin Resistance.     Leigh is presenting today for a new weight management nutrition consultation. Pt has had an initial appointment with Dr. Holguin.    Weight loss medication: Wegovy.-noticed that portion sizes are much smaller since starting the medication      Anthropometrics:    Pt's weight is 272 lbs  Initial weight: 275 lbs  Weight change: 3 lbs (down)  BMI: There is no height or weight on file to calculate BMI.   Ideal body weight: 66.2 kg (145 lb 15.1 oz)  Adjusted ideal body weight: 89.7 kg (197 lb 12.3 oz)    Estimated RMR ([place]-St Jeor equation):  2025 kcals x 1.3 (light active) = 2633 kcals (for weight maintenance)    Recommended Protein Intake: 60-80 grams of protein/day    Medical History:  Patient Active Problem List   Diagnosis     PCOS (polycystic ovarian syndrome)     Recurrent major depressive disorder, in partial remission (H)     Anxiety     Morbid obesity (H)      Diabetes: No-Insulin Resistance (Metformin)  HbA1c:  No results found for: HGBA1C    Nutrition History:   Food allergies/intolerances/cultural or religous food customs: No however sensitivity to certain textures (cotton candy, cauliflower)    Vitamins/Mineral Supplementation: Vitamin D,  Vitamin B12    Dietary Recall: (works overnight, tends to eat on the go)  Breakfast: something to snack on usually (crackers and chips) or ham sandwich or carrots and celery  Lunch: Fast Food  Dinner: bowl of cereal or eggs  Typical Snacks: chips, crackers, fruit (orange)  Overnight eating: No  Eating out: Taco Bell, McDonalds (before medication every other day)    Beverages: Energy Drinks (working on reducing), water, V8 Juice,     Exercise: walking at work, 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       Nutrition Intervention  1. Food and/or Nutrient Delivery   a. Placed emphasis on importance of developing a healthy meal routine, aiming for 3 meals a day and no snacks.  2. Nutrition Education   a. Discussed with patient how to build a meal: the importance of including a lean/low fat protein at each meal, include a source of vegetables at a minimum of lunch and dinner and limiting carbohydrate intake to <25% per meal.  b. Educated on sources of lean protein, portion sizes, the amount of grams found in each source. Recommend patient to aim for 20-30g protein at each meal.  c. Educated on how to read a food label: keeping total fat <10g and sugar <10g per serving.  d. Discussed the importance of adequate hydration, with emphasis on drinking 64oz of water or zero calorie beverages per day.  3. Nutrition Counseling   a. Encouraged importance of developing routine exercise for health benefits and weight loss.    Goals established by patient:   1. Focus on protein first, aiming for at least 60-80 grams of protein/day.   2. Incorporate high fiber foods into meals/snacks (I.e. veggies, fruit, whole grains, etc...)  3. Work on eliminating high fat/high sugar foods/beverages.     Handouts provided:  Non Surgical Weight Loss  packet.   Snack Ideas  Quick and Easy Meals  Protein Supplements    Assessment/Plan:    Pt will follow up in 3 month(s) with bariatrician and 1 month(s) with dietitian.         Video-Visit Details    Type of service:  Video Visit    Video Start Time (time video started): 12:47 pm    Video End Time (time video stopped): 1:17 pm    Originating Location (pt. Location): Home        Distant Location (provider location):  Off-site    Mode of Communication:  Video Conference via [facility]    Physician has received verbal consent for a Video Visit from the patient? Yes      Christine Jarrett RD            Again, thank you for allowing me to participate in the care of your patient.        Sincerely,        Christine Jarrett RD

## 2023-04-28 ENCOUNTER — VIRTUAL VISIT (OUTPATIENT)
Dept: FAMILY MEDICINE | Facility: CLINIC | Age: 28
End: 2023-04-28

## 2023-04-28 DIAGNOSIS — E66.9 OBESITY: Primary | ICD-10-CM

## 2023-04-28 PROCEDURE — 99207 PR MWM HEALTH COACH NO CHARGE: CPT

## 2023-04-28 ASSESSMENT — PATIENT HEALTH QUESTIONNAIRE - PHQ9
SUM OF ALL RESPONSES TO PHQ QUESTIONS 1-9: 1
SUM OF ALL RESPONSES TO PHQ QUESTIONS 1-9: 1
10. IF YOU CHECKED OFF ANY PROBLEMS, HOW DIFFICULT HAVE THESE PROBLEMS MADE IT FOR YOU TO DO YOUR WORK, TAKE CARE OF THINGS AT HOME, OR GET ALONG WITH OTHER PEOPLE: SOMEWHAT DIFFICULT

## 2023-04-28 NOTE — PROGRESS NOTES
"Reason for Visit: 24-Week Healthy Lifestyle Plan Follow up Visit - Health Coaching Virtual Visit    Progress Notes:  Return 24-Week Healthy Lifestyle Plan Weight Management Health Coaching Note - Virtual Visit  Leigh Roberts   MRN: [id number]  : 1995  MIGUEL: 2023    Health  Follow-up Visit #: 3  [facility]  Location: off-site/home office    Assessment:  Initial Start Date: 23  Graduation Date:  2023  Fred (online resource) enrollment date(s): 2023  Physician:  Dr. Holguin  Referred by: self/PCP  Initial Weight (lbs): 278 lbs. (reported with Dr. Holguin on 3/30/23)  Current Weight (lbs): 261 - 263.3 lbs. (down 14 lbs)  Average Daily Water (ounces): 30-40 oz/day (drinks more at work), also enjoys frappachinos from [employer], energy drinks   Weight Loss Medication: Wegovy (joined a support group on [employer])  Nutrition Plan: real whole foods    Conversation today :  Nutrition: probably the main focus with eating increase protein, more fruits and starting to add in more veggies. A lot less sugar.  Exercise/Movement/Activity: has gone about 3 times since our last conversation. Would like it to be 2x/week.   Other: physical body feels better (stomach and face less \"poofy\") clothes   Staying consistent and continue      Previous Goal(s) review:  Goals established on  with EDU King:  1. Focus on protein first, aiming for at least 60-80 grams of protein/day.   2. Incorporate high fiber foods into meals/snacks (I.e. veggies, fruit, whole grains, etc...)  3. Work on eliminating high fat/high sugar foods/beverages.       GOALS established today  by client:  Continue, working on consistency.  Nutrition: protein first, continue working on adding in fruits and veggies. Limit sugar per day.   Currently not tracking but looking into an isai  Other: tracking foods (checking into free apps such as [employer] Pal or Lose It)      Resources Provided:   Handouts " "provided from EDU King on :  Non Surgical Weight Loss packet.   Snack Ideas  Quick and Easy Meals  Protein Supplements      NOTES:  Living in [place], [place], single, 2 cats  Currently in school, working on getting her Associate Degree and looking to get bachelor's in Criminal Justice ( and Th in person)   thru , overnights working full-time at [place], [place]  Grew up in [place], foster home - 8 yrs old and her birth mom relinquished her parental rights - no contact with her birth mom  16 yrs old moved to living on her own in a home  Has an older sister (10 yrs older) lives in [place]. Going to visit her in May  Step-Dad \"Dad\" and his family living in [place], really close with him  Past history of drug use, 1 relapse in early   Has worked with a therapist and done anger management classes  Lost a son in   Anxiety meds as she learned she does experience anxiety after her loss  Looking to learn easy cooking meals that are healthier and that she likes/enjoys  Portion control  Eating in moderation and making mindful choices  Does not want to stay on medication long-term        Follow-up appointments:     with ANDRÉS Hannon #4 f/u   with EDU King f/u   with Dr. Ezio MD f/u      Reminder:  Monthly Healthy Lifestyle Support Groups offered virtually via TEAMS.  Contact Danita Stuart ([email or web address]) to be added to the invite list.  Upcoming:  (, 12:30pm to 1:30pm):    May 19: \"Let's Talk\" a time for the group to share  : \"Coaching Tools\"  July thru December - topics coming soon...here are the tentative dates:  July 7  August 4  September 1  October 6  November 3  December 1       SIGNATURE:  DOTTIE Nicholson, [facility]  National Board-Certified Health &   24-Week Healthy Lifestyle Plan Health   [facility]  email:  [email or web address]  appointment [phone]    "

## 2023-05-18 ENCOUNTER — HOSPITAL ENCOUNTER (EMERGENCY)
Facility: CLINIC | Age: 28
Discharge: HOME | End: 2023-05-18
Attending: NURSE PRACTITIONER | Admitting: NURSE PRACTITIONER
Payer: COMMERCIAL

## 2023-05-18 VITALS
DIASTOLIC BLOOD PRESSURE: 82 MMHG | RESPIRATION RATE: 18 BRPM | SYSTOLIC BLOOD PRESSURE: 128 MMHG | BODY MASS INDEX: 38.01 KG/M2 | OXYGEN SATURATION: 98 % | TEMPERATURE: 98.1 F | WEIGHT: 257.4 LBS | HEART RATE: 84 BPM

## 2023-05-18 DIAGNOSIS — M62.838 TRAPEZIUS MUSCLE SPASM: ICD-10-CM

## 2023-05-18 PROCEDURE — 99213 OFFICE O/P EST LOW 20 MIN: CPT | Performed by: NURSE PRACTITIONER

## 2023-05-18 PROCEDURE — G0463 HOSPITAL OUTPT CLINIC VISIT: HCPCS | Performed by: NURSE PRACTITIONER

## 2023-05-18 RX ORDER — LIDOCAINE 4 G/G
1-3 PATCH TOPICAL EVERY 24 HOURS
Qty: 30 PATCH | Refills: 0 | Status: SHIPPED | OUTPATIENT
Start: 2023-05-18

## 2023-05-18 RX ORDER — METHOCARBAMOL 500 MG/1
500-1000 TABLET, FILM COATED ORAL 3 TIMES DAILY PRN
Qty: 30 TABLET | Refills: 0 | Status: SHIPPED | OUTPATIENT
Start: 2023-05-18

## 2023-05-18 RX ORDER — SEMAGLUTIDE 1 MG/.5ML
1 INJECTION, SOLUTION SUBCUTANEOUS WEEKLY
COMMUNITY
End: 2023-06-19 | Stop reason: DRUGHIGH

## 2023-05-18 NOTE — DISCHARGE INSTRUCTIONS
Start the ibuprofen at 800 mg and take this 3 times a day regularly.  Use lidocaine patches and apply 1-3 patches on the areas that are tender.  Leave this on for 12 hours.  Then you must remain lidocaine patch free for 12 hours.  Additionally, you may take Robaxin 1 to 2 tablets by mouth 3 times daily at the same time that you are taking the ibuprofen when the pain is more severe.  Follow-up with primary care if no improvement in symptoms after 1 week.

## 2023-05-18 NOTE — ED PROVIDER NOTES
History     Chief Complaint   Patient presents with     Shoulder Pain     Knot in the right shoulder and maybe a pinched nerve. Difficult to lift items with right hand.      BONNIE Roberts is a 28 year old female who presents to urgent care with right neck, trapezius upper back pain.  Patient reports onset of symptoms 1 week ago.  Patient states she noted the pain after flying to [place].  Patient states she was carrying a backpack and she is right-hand dominant.  Patient states that she got off the plane she noted spasm in her right upper back and right-sided neck area.    Patient states at rest the pain level is 2-3 out of 10 and at times there is a shooting pain that radiates down to her arm mid upper right arm.  Patient states then the pain level is 7-8 out of 10.  Patient has tried alternating hot and cold packs, Tylenol 1000 mg 3 times daily ibuprofen 800 twice daily.  Patient states that some improvement.  Patient also tried a massage this past Monday and reports worsening symptoms since then.  Patient denies any blunt force trauma.  Patient denies devious history of the same pain.  Patient reports lifting, movement of the right arm and shoulder is aggravating to her pain.    Allergies:  Allergies   Allergen Reactions     Bactrim [Sulfamethoxazole-Trimethoprim]        Problem List:    Patient Active Problem List    Diagnosis Date Noted     Morbid obesity (H) 03/30/2023     Priority: Medium     PCOS (polycystic ovarian syndrome) 09/13/2022     Priority: Medium     Recurrent major depressive disorder, in partial remission (H) 09/13/2022     Priority: Medium     Anxiety 09/13/2022     Priority: Medium        Past Medical History:    Past Medical History:   Diagnosis Date     Bone and joint disord back, pelvis, leg complicat preg, childb, puerp 2022     Chemical dependency (H) 2018     Depressive disorder      CAMILA (generalized anxiety disorder)      Grief at loss of child      History of diabetes  mellitus 2022     Hx MRSA infection      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     Moderate recurrent major depression (H)      STD (sexually transmitted disease) 2020     Urinary incontinence 2020     Urinary tract infection 2018       Past Surgical History:    Past Surgical History:   Procedure Laterality Date     ABDOMEN SURGERY      Ago of [age]     Labor Induction  10/17/2020    lethal fetal complicaitons from maternal CMV       Family History:    Family History   Problem Relation Age of Onset     Cancer Mother      Breast Cancer Mother      Depression Mother      Anxiety Disorder Mother      Mental Illness Mother      Substance Abuse Mother      Thyroid Disease Mother      Other Cancer Maternal Grandmother      Ovarian Cancer Maternal Grandmother      Cancer Maternal Grandfather      Other Cancer Maternal Grandfather      Cancer Paternal Grandfather         Lung       Social History:  Marital Status:  Single [1]  Social History     Tobacco Use     Smoking status: Former     Years: 0.20     Types: Cigarettes     Smokeless tobacco: Never   Vaping Use     Vaping status: Never Used   Substance Use Topics     Alcohol use: Yes     Comment: occ     Drug use: Not Currently     Types: Marijuana, Methamphetamines, IV, GHB        Medications:    D-5000 125 MCG (5000 UT) tablet  escitalopram (LEXAPRO) 10 MG tablet  escitalopram (LEXAPRO) 5 MG tablet  Lidocaine (LIDOCARE) 4 % Patch  metFORMIN (GLUCOPHAGE XR) 500 MG 24 hr tablet  methocarbamol (ROBAXIN) 500 MG tablet  Semaglutide-Weight Management (WEGOVY) 1 MG/0.5ML pen  valACYclovir (VALTREX) 1000 mg tablet  pimecrolimus (ELIDEL) 1 % external cream          Review of Systems  As mentioned above in the history present illness. All other systems were reviewed and are negative.    Physical Exam   BP: 128/82  Pulse: 84  Temp: 98.1  F (36.7  C)  Resp: 18  Weight: 116.8 kg (257 lb 6.4 oz)  SpO2: 98 %      Physical Exam  Vitals and nursing note reviewed.    Constitutional:       General: She is not in acute distress.     Appearance: She is well-developed. She is not ill-appearing, toxic-appearing or diaphoretic.   HENT:      Head: Normocephalic and atraumatic.      Right Ear: External ear normal.      Left Ear: External ear normal.   Eyes:      General:         Right eye: No discharge.         Left eye: No discharge.      Conjunctiva/sclera: Conjunctivae normal.   Cardiovascular:      Rate and Rhythm: Normal rate and regular rhythm.      Heart sounds: Normal heart sounds. No murmur heard.     No friction rub.   Pulmonary:      Effort: Pulmonary effort is normal. No respiratory distress.      Breath sounds: Normal breath sounds. No stridor. No wheezing or rales.   Chest:      Chest wall: No tenderness.   Musculoskeletal:      Cervical back: Spasms (trapezius region, right) and tenderness (trapezius region, right) present. No deformity, signs of trauma, lacerations, torticollis or bony tenderness. Normal range of motion.        Back:    Skin:     General: Skin is warm and dry.      Coloration: Skin is not pale.      Findings: No erythema or rash.   Neurological:      Mental Status: She is alert.         ED Course                 Procedures    No results found for this or any previous visit (from the past 24 hour(s)).    Medications - No data to display    Assessments & Plan (with Medical Decision Making)     I have reviewed the nursing notes.    I have reviewed the findings, diagnosis, plan and need for follow up with the patient.  28-year-old female presents with right upper back pain following carrying a backpack and trip to [place] 1 week ago.  Patient has trialed hot packs, cold packs, Tylenol, ibuprofen without drastic improvement.  Patient then subsequently had a massage and now symptoms are worsening.  On exam patient has muscle spasms in the trapezius region no obvious deformities noted.  Given that there is no blunt force trauma, will trial relaxers with NSAIDs.   Discussed with patient follow-up with spine care versus physical therapy versus primary care.  Patient requesting to follow-up with her primary care and declines the physical therapy and/or spine care and or sports orthopedics.  Patient declines any other needs.  No indication for urgent imaging today.      Discharge Medication List as of 5/18/2023  4:24 PM      START taking these medications    Details   Lidocaine (LIDOCARE) 4 % Patch Place 1-3 patches onto the skin every 24 hours To prevent lidocaine toxicity, patient should be patch free for 12 hrs daily.Disp-30 patch, [id number]      methocarbamol (ROBAXIN) 500 MG tablet Take 1-2 tablets (500-1,000 mg) by mouth 3 times daily as needed for muscle spasms, Disp-30 tablet, R-0, E-Prescribe             Final diagnoses:   Trapezius muscle spasm - right       5/18/2023   [facility] EMERGENCY DEPT     Ni Wang, AYAKA CNP  05/18/23 0617

## 2023-05-19 ENCOUNTER — VIRTUAL VISIT (OUTPATIENT)
Dept: FAMILY MEDICINE | Facility: CLINIC | Age: 28
End: 2023-05-19

## 2023-05-19 DIAGNOSIS — E66.9 OBESITY: Primary | ICD-10-CM

## 2023-05-19 PROCEDURE — 99207 PR MWM HEALTH COACH NO CHARGE: CPT

## 2023-05-19 NOTE — PROGRESS NOTES
Reason for Visit: 24-Week Healthy Lifestyle Plan Follow up Visit - Health Coaching Virtual Visit    Progress Notes:  Return 24-Week Healthy Lifestyle Plan Weight Management Health Coaching Note - Virtual Visit  Leigh Roberts   MRN: [id number]  : 1995  MIGUEL: 2023    Health  Follow-up Visit #: 4  [facility]  Location: off-site/home office  Start time: 9:00 am  End time: 9:25 am    Assessment:  Initial Start Date: 23  Graduation Date:  2023  Fred (online resource) enrollment date(s): 2023  Physician:  Dr. Holguin  Referred by: self/PCP  Initial Weight (lbs): 278 lbs. (reported with Dr. Holguin on 3/30/23)  Current Weight (lbs): 257 lbs.  Average Daily Water (ounces): 30-40 oz/day (drinks more at work), also enjoys frappachinos from [employer], energy drinks   Weight Loss Medication: Wegovy (joined a support group on [employer])  Nutrition Plan: real whole foods      Conversation today:  Nutrition: been eating in, watching/mindful amount of food. Did just get back from vacation in [place] (sister's graduation) gained 3 lbs.  Other: Overall energy is pretty good. Dealing with a muscle kink in her shoulder right now.   She is in the process of moving home back to [place] - end of .      Previous Goal(s) review:  Goals established on  with EDU King:  1. Focus on protein first, aiming for at least 60-80 grams of protein/day.   2. Incorporate high fiber foods into meals/snacks (I.e. veggies, fruit, whole grains, etc...)  3. Work on eliminating high fat/high sugar foods/beverages        Goals established  by client:  Continue, working on consistency  Nutrition: protein first, continue working on adding in fruits and veggies. Limit sugar per day.   Currently not tracking but looking into an isai  Other: tracking foods (checking into free apps such as [employer] Pal or Lose It)        Resources Provided:   Handouts provided from EDU King on  "4/21:  Non Surgical Weight Loss packet.   Snack Ideas  Quick and Easy Meals  Protein Supplements       GOALS established today 5/19 by client:  #1 focus: eating right. Continue implementing improved techniques, eating out less and less often. Choosing wisely.  Other: pay attention to   Other: May cancel her group on [employer] for Wegovy group  Lose-It isai - tracked for 17 days, took a break while on vacation      NOTES:  Living in [place], [place], single, 2 cats  Currently in school, working on getting her Associate Degree and looking to get bachelor's in Criminal Justice ([employer] in person)  Saturdays thru Tuesdays, overnights working full-time at [employer], [place]  Grew up in [place], foster home - 8 yrs old and her birth mom relinquished her parental rights - no contact with her birth mom  End of June 2023: will be moving back to [place] and hoping to work in Corrections  16 yrs old moved to living on her own in a home  Has an older sister (10 yrs older) lives in [place]. Going to visit her in May  Step-Dad \"Dad\" and his family living in [place], really close with him  Past history of drug use, 1 relapse in early 20's  Has worked with a therapist and done anger management classes  Lost a son in 2020  Anxiety meds as she learned she does experience anxiety after her loss  Looking to learn easy cooking meals that are healthier and that she likes/enjoys  Portion control  Eating in moderation and making mindful choices  Does not want to stay on medication long-term      Follow-up appointments:    5/23 with EDU King f/u  6/9 with Riddhi, HC #5  7/6 with Dr. Ezio MD f/u      Reminder:  Monthly Healthy Lifestyle Support Groups offered virtually via TEAMS.  Contact Danita Davidson [email or web address]) to be added to the invite list.  Upcoming:  (Fridays, 12:30pm to 1:30pm):    June 9: Creating Your Coaching Toolkit ~  Yourself!  July - No Meeting this month  August 4: Let's Talk Fiber ~ with Natanael Whittaker, " "RD  : Show & Tell: Share Your Favorite Wellness Inspirations, Hacks, Quotes, Books, Recipes, Apps, Podcasts Class  : Let's Talk ~ Open Discussion to Share Wins and Challenges  November 3: Introduction to Mindfulness  : Let's Talk ~ Open Discussion to Share Wins and Challenges\"       SIGNATURE:  DOTTIE Nicholson, [facility]  National Board-Certified Health &   24-Week Healthy Lifestyle Plan Health   [facility]  email:  [email or web address]  appointment [phone]    "

## 2023-05-23 ENCOUNTER — VIRTUAL VISIT (OUTPATIENT)
Dept: SURGERY | Facility: CLINIC | Age: 28
End: 2023-05-23
Payer: COMMERCIAL

## 2023-05-23 DIAGNOSIS — Z71.3 NUTRITIONAL COUNSELING: ICD-10-CM

## 2023-05-23 DIAGNOSIS — E66.09 CLASS 2 OBESITY DUE TO EXCESS CALORIES WITHOUT SERIOUS COMORBIDITY WITH BODY MASS INDEX (BMI) OF 38.0 TO 38.9 IN ADULT: Primary | ICD-10-CM

## 2023-05-23 DIAGNOSIS — E66.812 CLASS 2 OBESITY DUE TO EXCESS CALORIES WITHOUT SERIOUS COMORBIDITY WITH BODY MASS INDEX (BMI) OF 38.0 TO 38.9 IN ADULT: Primary | ICD-10-CM

## 2023-05-23 PROCEDURE — 97803 MED NUTRITION INDIV SUBSEQ: CPT | Mod: VID | Performed by: DIETITIAN, REGISTERED

## 2023-05-23 NOTE — LETTER
5/23/2023         RE: Leigh Roberts  [address]  [address]  [place] [address]        Dear Colleague,    Thank you for referring your patient, Leigh Roberts, to the [facility] AND BARIATRICS CARE [place]. Please see a copy of my visit note below.    Leigh Roberts is a 28 year old who is being evaluated via a billable video visit.      How would you like to obtain your AVS? MyChart  If the video visit is dropped, the invitation should be resent by: Send to e-mail at: [email or web address]  Will anyone else be joining your video visit? No        Medical  Weight Loss Follow-Up Diet Evaluation  Assessment:  Leigh is presenting today for a follow up weight management nutrition consultation.  This patient has had an initial appointment and was referred by Dr. Holguin for MNT as treatment for Obesity which is impacting her Insulin Resistance.     Weight loss medication: Wegovy.   Pt's weight is 257 lbs   Initial weight: 275 lbs   Weight change: 18 lbs (down)          View : No data to display.              BMI: There is no height or weight on file to calculate BMI.  Ideal body weight: 66.2 kg (145 lb 15.1 oz)  Adjusted ideal body weight: 86.4 kg (190 lb 8.4 oz)    Estimated RMR (Beaver-St Jeor equation):   1957 kcals x 1.3 (light active) = 2544 kcals (for weight maintenance)     Recommended Protein Intake: 60-80 grams of protein/day  Patient Active Problem List:  Patient Active Problem List   Diagnosis     PCOS (polycystic ovarian syndrome)     Recurrent major depressive disorder, in partial remission (H)     Anxiety     Morbid obesity (H)     Diabetes: No     Progress on goals from last visit: Has been working on reduced eating out frequency as well as making healthy food choices (incorporating more veggies and fruit) into meals and snacks.     Dietary Recall:  Breakfast: homemade egg and cheese sandwich on whole wheat english muffin or occasional cereal or yogurt    Lunch:Protein Shake or Chicken with a salad, rice   Dinner:skipped   Typical snacks: cheese sticks or apple or carrots   Beverages: Water-does well at work, needs to work on at home, [employer] on occasion  Exercise: 2 times/week for 30-60 minutes stretching, weights, biking/treadmill    Nutrition Diagnosis:    Obesity (NC 3.3) related to overeating and poor lifestyle habits as evidenced by patient's subjective statements and BMI of 38 kg/m2.     Intervention:  1. Food and/or nutrient delivery: balanced meals, adequate protein   2. Nutrition education: snack ideas, carbohydrate consumption  3. Nutrition counseling: provided support and encouragement     Monitoring/Evaluation:    Goals:  1. Work on reduced carbohydrate consumption, aiming for less than 150 grams of net carbs/day.   2. Work on implementation of a third meal, trial of protein shake at supper as an alternative.     Patient to follow up in 1 month(s) with bariatrician and 1 month(s) with RD      Video-Visit Details    Type of service:  Video Visit    Video Start Time (time video started): 9:08 am    Video End Time (time video stopped): 9:27 am     Originating Location (pt. Location): Home        Distant Location (provider location):  Off-site    Mode of Communication:  Video Conference via [facility]    Physician has received verbal consent for a Video Visit from the patient? Yes      Christine Jarrett RD            Again, thank you for allowing me to participate in the care of your patient.        Sincerely,        Christine Jarrett RD

## 2023-05-23 NOTE — PROGRESS NOTES
Leigh Roberts is a 28 year old who is being evaluated via a billable video visit.      How would you like to obtain your AVS? MyChart  If the video visit is dropped, the invitation should be resent by: Send to e-mail at: [email or web address]  Will anyone else be joining your video visit? No        Medical  Weight Loss Follow-Up Diet Evaluation  Assessment:  Leigh is presenting today for a follow up weight management nutrition consultation.  This patient has had an initial appointment and was referred by Dr. Holguin for MNT as treatment for Obesity which is impacting her Insulin Resistance.     Weight loss medication: Wegovy.   Pt's weight is 257 lbs   Initial weight: 275 lbs   Weight change: 18 lbs (down)          View : No data to display.              BMI: There is no height or weight on file to calculate BMI.  Ideal body weight: 66.2 kg (145 lb 15.1 oz)  Adjusted ideal body weight: 86.4 kg (190 lb 8.4 oz)    Estimated RMR (Waller-St Jeor equation):   1957 kcals x 1.3 (light active) = 2544 kcals (for weight maintenance)     Recommended Protein Intake: 60-80 grams of protein/day  Patient Active Problem List:  Patient Active Problem List   Diagnosis     PCOS (polycystic ovarian syndrome)     Recurrent major depressive disorder, in partial remission (H)     Anxiety     Morbid obesity (H)     Diabetes: No     Progress on goals from last visit: Has been working on reduced eating out frequency as well as making healthy food choices (incorporating more veggies and fruit) into meals and snacks.     Dietary Recall:  Breakfast: homemade egg and cheese sandwich on whole wheat english muffin or occasional cereal or yogurt   Lunch:Protein Shake or Chicken with a salad, rice   Dinner:skipped   Typical snacks: cheese sticks or apple or carrots   Beverages: Water-does well at work, needs to work on at home, [employer] on occasion  Exercise: 2 times/week for 30-60 minutes stretching, weights,  biking/treadmill    Nutrition Diagnosis:    Obesity (NC 3.3) related to overeating and poor lifestyle habits as evidenced by patient's subjective statements and BMI of 38 kg/m2.     Intervention:  1. Food and/or nutrient delivery: balanced meals, adequate protein   2. Nutrition education: snack ideas, carbohydrate consumption  3. Nutrition counseling: provided support and encouragement     Monitoring/Evaluation:    Goals:  1. Work on reduced carbohydrate consumption, aiming for less than 150 grams of net carbs/day.   2. Work on implementation of a third meal, trial of protein shake at supper as an alternative.     Patient to follow up in 1 month(s) with bariatrician and 1 month(s) with RD      Video-Visit Details    Type of service:  Video Visit    Video Start Time (time video started): 9:08 am    Video End Time (time video stopped): 9:27 am     Originating Location (pt. Location): Home        Distant Location (provider location):  Off-site    Mode of Communication:  Video Conference via [facility]    Physician has received verbal consent for a Video Visit from the patient? Yes      Christine Jarrett RD

## 2023-05-31 ENCOUNTER — MYC MEDICAL ADVICE (OUTPATIENT)
Dept: SURGERY | Facility: CLINIC | Age: 28
End: 2023-05-31
Payer: COMMERCIAL

## 2023-05-31 DIAGNOSIS — E66.09 CLASS 2 OBESITY DUE TO EXCESS CALORIES WITHOUT SERIOUS COMORBIDITY WITH BODY MASS INDEX (BMI) OF 38.0 TO 38.9 IN ADULT: Primary | ICD-10-CM

## 2023-05-31 DIAGNOSIS — E66.812 CLASS 2 OBESITY DUE TO EXCESS CALORIES WITHOUT SERIOUS COMORBIDITY WITH BODY MASS INDEX (BMI) OF 38.0 TO 38.9 IN ADULT: Primary | ICD-10-CM

## 2023-06-14 ENCOUNTER — VIRTUAL VISIT (OUTPATIENT)
Dept: FAMILY MEDICINE | Facility: CLINIC | Age: 28
End: 2023-06-14

## 2023-06-14 DIAGNOSIS — E66.9 OBESITY: Primary | ICD-10-CM

## 2023-06-14 PROCEDURE — 99207 PR MWM HEALTH COACH NO CHARGE: CPT

## 2023-06-14 NOTE — PROGRESS NOTES
Reason for Visit: 24-Week Healthy Lifestyle Plan Follow up Visit - Health Coaching Virtual Visit    Progress Notes:  Return 24-Week Healthy Lifestyle Plan Weight Management Health Coaching Note - Virtual Visit  Leigh Roberts   MRN: [id number]  : 1995  MIGUEL: 2023    Health  Follow-up Visit #: 5  [facility]  Location: off-site/home office  Start time: 1:31 pm  End time: 1:58 pm    Assessment:  Initial Start Date: 23  Graduation Date:  2023  Fred (online resource) enrollment date(s): 2023  Physician:  Dr. Holguin  Referred by: self/PCP  Initial Weight (lbs): 278 lbs. (reported with EDU King 23)  Current Weight (lbs): 257 lbs.  Average Daily Water (ounces): 30-40 oz/day (drinks more at work), also enjoys frappachinos from [employer], energy drinks   Weight Loss Medication: Wegovy (joined & then cancelled the support group on [employer])  Nutrition Plan: real whole foods    Conversation today :  Nutrition: eating out less and eating less in portion sizes. Exploring more meal options and making sure she has things on hand.  Other: looking forward to being done packing for her move to [place], [place]  Other: final day at work is early next week, , leaving the  for [place], [place]  Other: girls night on  (girls nights, out to eat and then back to her place for game night)    Previous Goal(s) review:  Goals established on  with EDU King:  1. Focus on protein first, aiming for at least 60-80 grams of protein/day.   2. Incorporate high fiber foods into meals/snacks (I.e. veggies, fruit, whole grains, etc...)  3. Work on eliminating high fat/high sugar foods/beverages    Goals established  by client:  #1 focus: eating right. Continue implementing improved techniques, eating out less and less often. Choosing wisely. - Mindful of making a choice and not eating out as often and eating less in  "portions  Other: May cancel her group on [employer] for Wegovy group  Lose-It isai - tracked for 17 days, took a break while on vacation    Goals established on 5/23 with EDU King:  1. Work on reduced carbohydrate consumption, aiming for less than 150 grams of net carbs/day.   2. Work on implementation of a third meal, trial of protein shake at supper as an alternative.      Goals established on 4/28 by client:  Continue, working on consistency  Nutrition: protein first, continue working on adding in fruits and veggies. Limit sugar per day.   Currently not tracking but looking into an isai  Other: tracking foods (checking into free apps such as MyFitness Pal or Lose It)        Resources Provided:   Handouts provided from EDU King on 4/21:  Non Surgical Weight Loss packet.   Snack Ideas  Quick and Easy Meals  Protein Supplements     GOALS established today 6/14 by client:  Sticking to the current goals she has with food, etc while she is moving in the next few weeks to [place], [place] (by the end of June)  Nutrition: continue to use Lose-It isai and uses a food scale to help with portions sizes      Resources Provided:   Handouts provided from EDU King on 4/21:  Non Surgical Weight Loss packet.   Snack Ideas  Quick and Easy Meals  Protein Supplements      NOTES:  Living in [place], [place], single, 2 cats  Currently in school, working on getting her Associate Degree and looking to get bachelor's in Criminal Justice ([employer] in person)  Saturdays thru Tuesdays, overnights working full-time at [place], [place]  Grew up in [place], foster home - 8 yrs old and her birth mom relinquished her parental rights - no contact with her birth mom  End of June 2023: will be moving back to [place] and hoping to work in Corrections  16 yrs old moved to living on her own in a home  Has an older sister (10 yrs older) lives in [place]. Going to visit her in May  Step-Dad \"Dad\" and his family living in [place], really close " with him  Past history of drug use, 1 relapse in early   Has worked with a therapist and done anger management classes  Lost a son in   Anxiety meds as she learned she does experience anxiety after her loss  Looking to learn easy cooking meals that are healthier and that she likes/enjoys  Portion control  Eating in moderation and making mindful choices  Does not want to stay on medication long-term      Follow-up appointments:     with EDU King f/u   with Riddhi, HC #6 f/u   with Dr. Holguin f/u      Reminder:  Monthly Healthy Lifestyle Support Groups offered virtually via [employer] TEAMS.  Contact Danita Davidson ([email or web address]) to be added to the invite list.  Upcoming:  (, 12:30pm to 1:30pm):    : Creating Your Coaching Toolkit ~  Yourself!  : Let's Talk ~ Open Discussion to Share Wins and Challenges  : Let's Talk Fiber ~ with Natanael Whittaker RD  : Show & Tell: Share Your Favorite Wellness Inspirations, Hacks, Quotes, Books, Recipes, Apps, Podcasts Class  : Let's Talk ~ Open Discussion to Share Wins and Challenges  November 3: Introduction to Mindfulness  : Let's Talk ~ Open Discussion to Share Wins and Challenges       SIGNATURE:  DOTTIE Nicholson, [facility]  National Board-Certified Health &   24-Week Healthy Lifestyle Plan Health   [place] [facility]  email:  [email or web address]  appointment [phone]

## 2023-06-17 ENCOUNTER — MYC MEDICAL ADVICE (OUTPATIENT)
Dept: SURGERY | Facility: CLINIC | Age: 28
End: 2023-06-17
Payer: COMMERCIAL

## 2023-06-17 DIAGNOSIS — R11.0 NAUSEA: Primary | ICD-10-CM

## 2023-06-19 RX ORDER — ONDANSETRON 4 MG/1
4 TABLET, ORALLY DISINTEGRATING ORAL EVERY 8 HOURS PRN
Qty: 60 TABLET | Refills: 1 | Status: SHIPPED | OUTPATIENT
Start: 2023-06-19

## 2023-06-21 ENCOUNTER — TRANSFERRED RECORDS (OUTPATIENT)
Dept: HEALTH INFORMATION MANAGEMENT | Facility: CLINIC | Age: 28
End: 2023-06-21
Payer: COMMERCIAL

## 2023-06-23 ENCOUNTER — VIRTUAL VISIT (OUTPATIENT)
Dept: SURGERY | Facility: CLINIC | Age: 28
End: 2023-06-23
Payer: COMMERCIAL

## 2023-06-23 DIAGNOSIS — Z71.3 NUTRITIONAL COUNSELING: ICD-10-CM

## 2023-06-23 DIAGNOSIS — E66.812 CLASS 2 OBESITY DUE TO EXCESS CALORIES WITHOUT SERIOUS COMORBIDITY WITH BODY MASS INDEX (BMI) OF 35.0 TO 35.9 IN ADULT: Primary | ICD-10-CM

## 2023-06-23 DIAGNOSIS — E66.09 CLASS 2 OBESITY DUE TO EXCESS CALORIES WITHOUT SERIOUS COMORBIDITY WITH BODY MASS INDEX (BMI) OF 35.0 TO 35.9 IN ADULT: Primary | ICD-10-CM

## 2023-06-23 PROCEDURE — 97803 MED NUTRITION INDIV SUBSEQ: CPT | Mod: VID | Performed by: DIETITIAN, REGISTERED

## 2023-06-23 NOTE — PROGRESS NOTES
Leigh Roberts is a 28 year old who is being evaluated via a billable /video visit.      How would you like to obtain your AVS? MyChart  If the video visit is dropped, the invitation should be resent by: Send to e-mail at: [email or web address]  Will anyone else be joining your video visit? No        Medical  Weight Loss Follow-Up Diet Evaluation  Assessment:  Leigh is presenting today for a follow up weight management nutrition consultation.  This patient has had an initial appointment and was referred by Dr. Holguin for MNT as treatment for Obesity which is impacting her Insulin Resistance.     Weight loss medication: Wegovy.   Pt's weight is 237 lbs  Initial weight: 275 lbs   Weight change: 38 lbs (down)          No data to display              BMI: There is no height or weight on file to calculate BMI.  Ideal body weight: 66.2 kg (145 lb 15.1 oz)  Adjusted ideal body weight: 86.4 kg (190 lb 8.4 oz)    Estimated RMR ([place]-St Jeor equation):   1867 kcals x 1.3 (light active) = 2427 kcals (for weight maintenance)     Recommended Protein Intake: 60-80 grams of protein/day  Patient Active Problem List:  Patient Active Problem List   Diagnosis     PCOS (polycystic ovarian syndrome)     Recurrent major depressive disorder, in partial remission (H)     Anxiety     Morbid obesity (H)       Progress on goals from last visit: Has been avoiding energy drinks and is trying to consume mainly water.   Has been working on adequate nutrition, noting that she is trying to get enough in throughout the day, noting that the Wegovy is certainly suppressing things.  Has also been working on reduced consumption of fast food, trying to eat more from home.  Patient reports that she isn't consuming as much vegetables as she should due to the focus being on moving.     Dietary Recall:  Breakfast: eggs or greek yogurt or granola with Fairlife milk or breakfast sandwich   Lunch:cheese stick, orange, crackers   Dinner:chicken or  boneless pork chops, potatoes on the side  Typical snacks: cheese sticks or popcorn   Beverages: Water-50 oz/day (average)  Exercise: has been extremely busy, therefore exercise is down more recently.  Is also moving    Nutrition Diagnosis:    Obesity (NC 3.3) related to overeating and poor lifestyle habits as evidenced by patient's subjective statements and BMI 35.1 kg/m2.     Intervention:  1. Food and/or nutrient delivery: balanced meals, adequate protein  2. Nutrition education: none   3. Nutrition counseling: provided support and encouragement     Monitoring/Evaluation:    Goals:  1. Continues to focus on protein first at each meal, aiming for 20-30 grams of protein/meal, 3 meals/day.   2. Work on re-establishing an exercise regimen.     Patient to follow up in 1 month(s) with bariatrician and 2 month(s) with RD      Video-Visit Details    Type of service:  Video Visit    Video Start Time (time video started): 1:46 pm    Video End Time (time video stopped): 2:05 pm     Originating Location (pt. Location): Home        Distant Location (provider location):  Off-site    Mode of Communication:  Video Conference via [facility]    Physician has received verbal consent for a Video Visit from the patient? Yes      Christine Jarrett RD

## 2023-06-23 NOTE — LETTER
6/23/2023         RE: Leigh Roberts  [address]  [address]  [place] [address]        Dear Colleague,    Thank you for referring your patient, Leigh Roberts, to the [facility] [place]. Please see a copy of my visit note below.    Leigh Roberts is a 28 year old who is being evaluated via a billable /video visit.      How would you like to obtain your AVS? MyChart  If the video visit is dropped, the invitation should be resent by: Send to e-mail at: [email or web address]  Will anyone else be joining your video visit? No        Medical  Weight Loss Follow-Up Diet Evaluation  Assessment:  Leigh is presenting today for a follow up weight management nutrition consultation.  This patient has had an initial appointment and was referred by Dr. Holguin for MNT as treatment for Obesity which is impacting her Insulin Resistance.     Weight loss medication: Wegovy.   Pt's weight is 237 lbs  Initial weight: 275 lbs   Weight change: 38 lbs (down)          No data to display              BMI: There is no height or weight on file to calculate BMI.  Ideal body weight: 66.2 kg (145 lb 15.1 oz)  Adjusted ideal body weight: 86.4 kg (190 lb 8.4 oz)    Estimated RMR ([place]-St Jeor equation):   1867 kcals x 1.3 (light active) = 2427 kcals (for weight maintenance)     Recommended Protein Intake: 60-80 grams of protein/day  Patient Active Problem List:  Patient Active Problem List   Diagnosis     PCOS (polycystic ovarian syndrome)     Recurrent major depressive disorder, in partial remission (H)     Anxiety     Morbid obesity (H)       Progress on goals from last visit: Has been avoiding energy drinks and is trying to consume mainly water.   Has been working on adequate nutrition, noting that she is trying to get enough in throughout the day, noting that the Wegovy is certainly suppressing things.  Has also been working on reduced consumption of fast food, trying to eat  more from home.  Patient reports that she isn't consuming as much vegetables as she should due to the focus being on moving.     Dietary Recall:  Breakfast: eggs or greek yogurt or granola with Fairlife milk or breakfast sandwich   Lunch:cheese stick, orange, crackers   Dinner:chicken or boneless pork chops, potatoes on the side  Typical snacks: cheese sticks or popcorn   Beverages: Water-50 oz/day (average)  Exercise: has been extremely busy, therefore exercise is down more recently.  Is also moving    Nutrition Diagnosis:    Obesity (NC 3.3) related to overeating and poor lifestyle habits as evidenced by patient's subjective statements and BMI 35.1 kg/m2.     Intervention:  1. Food and/or nutrient delivery: balanced meals, adequate protein  2. Nutrition education: none   3. Nutrition counseling: provided support and encouragement     Monitoring/Evaluation:    Goals:  1. Continues to focus on protein first at each meal, aiming for 20-30 grams of protein/meal, 3 meals/day.   2. Work on re-establishing an exercise regimen.     Patient to follow up in 1 month(s) with bariatrician and 2 month(s) with RD      Video-Visit Details    Type of service:  Video Visit    Video Start Time (time video started): 1:46 pm    Video End Time (time video stopped): 2:05 pm     Originating Location (pt. Location): Home        Distant Location (provider location):  Off-site    Mode of Communication:  Video Conference via [facility]    Physician has received verbal consent for a Video Visit from the patient? Yes      Christine Jarrett RD            Again, thank you for allowing me to participate in the care of your patient.        Sincerely,        Christine Jarrett RD

## 2023-07-06 ENCOUNTER — VIRTUAL VISIT (OUTPATIENT)
Dept: FAMILY MEDICINE | Facility: CLINIC | Age: 28
End: 2023-07-06

## 2023-07-06 DIAGNOSIS — E66.9 OBESITY: Primary | ICD-10-CM

## 2023-07-06 PROCEDURE — 99207 PR MWM HEALTH COACH NO CHARGE: CPT

## 2023-07-06 NOTE — PROGRESS NOTES
Reason for Visit: 24-Week Healthy Lifestyle Plan Follow up Visit - Health Coaching Virtual Visit    Progress Notes:  Return 24-Week Healthy Lifestyle Plan Weight Management Health Coaching Note - Virtual Visit  Leigh Roberts   MRN: [id number]  : 1995  MIGUEL: 2023    Health  Follow-up Visit #: 6  [facility]  Location: off-site/home office  Start time: 2:00 pm  End time: 2:22 pm    Assessment:  Initial Start Date: 23  Graduation Date:  2023  Fred (online resource) enrollment date(s): 2023  Physician:  Dr. Holguin  Referred by: self/PCP  Initial Weight (lbs): 278 lbs. (reported with EDU King 23)  Current Weight (lbs): did not report today, was previously 237 lbs (visit with EDU King on )  Average Daily Water (ounces): 30-40 oz/day (drinks more at work), also enjoys frappachinos from [employer], energy drinks   Weight Loss Medication: Wegovy (joined & then cancelled the support group on [employer])  Nutrition Plan: real whole foods      Conversation today :  Nutrition: hasn't used Lose-It isai since moving these past couple weeks. Has unboxed kitchen and is back to using scale. Food is going well overall, even with moving from [place] to [facility]  Exercise/Movement/Activity: has recently moved to [facility]  Sleep: New schedule, adjusting: started yesterday: working day shift at new position, Monday thru Friday.   Other: moved to [place], found a new job. Physically doing alright, somewhat tired, sleeping pretty good.  Medication: called Wegovy, has 2 pens that sort of leaked. Will likely be receiving a voucher. Has a new box, ready to go.      Conversation :  Nutrition: eating out less and eating less in portion sizes. Exploring more meal options and making sure she has things on hand.  Other: looking forward to being done packing for her move to [place], [place]  Other: final day at work is early next week, Tuesday, , leaving  "the 24th for [place], [place]  Other: girls night on June 23 (girls nights, out to eat and then back to her place for game night)      Previous Goal(s) review:  GOALS established 6/14 by client:  Sticking to the current goals she has with food, etc while she is moving in the next few weeks to [place], [place] (by the end of June)  Nutrition: continue to use [employer] isai and uses a food scale to help with portions sizes    Goals established 6/23 by Client with EDU King:  1. Continues to focus on protein first at each meal, aiming for 20-30 grams of protein/meal, 3 meals/day.   2. Work on re-establishing an exercise regimen.       GOALS established today 7/6 by client:  Overall, working on nutrition, choosing healthier options, noticing if a craving comes up, then can enjoy a beverage with only 5 calories, comes in fruity flavors  Nutrition: re-starting using the TelllerIt isai, using scale in kitchen (all is unboxed)  Adjusting and unpacking in new environment. Takes time.        Resources Provided:   Handouts provided from EDU King on 4/21:  Non Surgical Weight Loss packet.   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 Going to visit her in May  Step-Dad \"Dad\" and his family living in [place], really close with him  Past history of drug use, 1 relapse in early 20's  Has worked with a therapist and done anger management " classes  Lost a son in   Anxiety meds as she learned she does experience anxiety after her loss  Looking to learn easy cooking meals that are healthier and that she likes/enjoys  Portion control  Eating in moderation and making mindful choices  Does not want to stay on medication long-term         Follow-up appointments:     with Dr. Holguin f/u   with ANDRÉS Hannon #7   with EDU King f/u        Reminder:  Monthly Healthy Lifestyle Support Groups offered virtually via [employer] TEAMS.  Contact Danita Davidson ([email or web address]) to be added to the invite list.  Upcoming:  (, 12:30pm to 1:30pm):    : Let's Talk ~ Open Discussion to Share Wins and Challenges  : Let's Talk Fiber ~ with Natanael Whittaker RD  : Show & Tell: Share Your Favorite Wellness Inspirations, Hacks, Quotes, Books, Recipes, Apps, Podcasts Class  : Let's Talk ~ Open Discussion to Share Wins and Challenges  November 3: Introduction to Mindfulness  : Let's Talk ~ Open Discussion to Share Wins and Challenges       SIGNATURE:  DOTTIE Nicholson, [facility]  National Board-Certified Health &   24-Week Healthy Lifestyle Plan Health   [facility]  email:  [email or web address]  appointment [phone]

## 2023-07-21 ENCOUNTER — VIRTUAL VISIT (OUTPATIENT)
Dept: SURGERY | Facility: CLINIC | Age: 28
End: 2023-07-21
Payer: COMMERCIAL

## 2023-07-21 ENCOUNTER — MYC MEDICAL ADVICE (OUTPATIENT)
Dept: SURGERY | Facility: CLINIC | Age: 28
End: 2023-07-21

## 2023-07-21 VITALS — HEIGHT: 69 IN | BODY MASS INDEX: 34.07 KG/M2 | WEIGHT: 230 LBS

## 2023-07-21 DIAGNOSIS — E66.9 OBESITY (BMI 30-39.9): ICD-10-CM

## 2023-07-21 DIAGNOSIS — E28.2 PCOS (POLYCYSTIC OVARIAN SYNDROME): ICD-10-CM

## 2023-07-21 DIAGNOSIS — E66.01 MORBID OBESITY (H): Primary | ICD-10-CM

## 2023-07-21 PROCEDURE — 99214 OFFICE O/P EST MOD 30 MIN: CPT | Mod: VID | Performed by: FAMILY MEDICINE

## 2023-07-21 RX ORDER — PHENTERMINE HYDROCHLORIDE 37.5 MG/1
18.75-37.5 TABLET ORAL
Qty: 90 TABLET | Refills: 1 | Status: SHIPPED | OUTPATIENT
Start: 2023-07-21 | End: 2024-01-17
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
## 2023-07-25 ENCOUNTER — TELEPHONE (OUTPATIENT)
Dept: SURGERY | Facility: CLINIC | Age: 28
End: 2023-07-25

## 2023-07-25 NOTE — TELEPHONE ENCOUNTER
Insurance requires a prior auth for wegovy 2.4 mg    [facility] [place] [facility]  ID# [id number]  [phone]    Thanks     Riddhi Alexander, [facility] [place]  [phone]

## 2023-07-26 NOTE — TELEPHONE ENCOUNTER
Prior Authorization Retail Medication Request    Medication/Dose: Wegovy 2.4 mg weekly injections    Rationale:  Wegovy along with medical nutrition therapy is reasonable for Leigh Roberts as a means of weight reduction.  Continuation of medication requested     Insurance Name:  ROSEANNA MEDINA PMAP  ID#: [id number]    Pharmacy Information (if different than what is on RX)  Name:  [facility] [place]  Phone:  [phone]

## 2023-07-29 NOTE — TELEPHONE ENCOUNTER
Prior Authorization Approval    Medication: WEGOVY 2.4 MG/0.75ML SC SOAJ  Authorization Effective Date: 4/22/2023  Authorization Expiration Date: 7/21/2024  Approved Dose/Quantity:   Reference #:     Insurance Company:    Expected CoPay:       CoPay Card Available:      Financial Assistance Needed:   Which Pharmacy is filling the prescription: [facility] [place] - [address] [facility]  Pharmacy Notified: No  Patient Notified: No

## 2023-08-05 ENCOUNTER — HEALTH MAINTENANCE LETTER (OUTPATIENT)
Age: 28
End: 2023-08-05

## 2023-08-10 DIAGNOSIS — E66.09 CLASS 2 OBESITY DUE TO EXCESS CALORIES WITHOUT SERIOUS COMORBIDITY WITH BODY MASS INDEX (BMI) OF 38.0 TO 38.9 IN ADULT: ICD-10-CM

## 2023-08-10 DIAGNOSIS — E66.812 CLASS 2 OBESITY DUE TO EXCESS CALORIES WITHOUT SERIOUS COMORBIDITY WITH BODY MASS INDEX (BMI) OF 38.0 TO 38.9 IN ADULT: ICD-10-CM

## 2023-11-10 ENCOUNTER — VIRTUAL VISIT (OUTPATIENT)
Dept: SURGERY | Facility: CLINIC | Age: 28
End: 2023-11-10

## 2023-11-10 VITALS — BODY MASS INDEX: 28.49 KG/M2 | WEIGHT: 199 LBS | HEIGHT: 70 IN

## 2023-11-10 DIAGNOSIS — E28.2 PCOS (POLYCYSTIC OVARIAN SYNDROME): ICD-10-CM

## 2023-11-10 DIAGNOSIS — E66.3 OVERWEIGHT (BMI 25.0-29.9): Primary | ICD-10-CM

## 2023-11-10 PROCEDURE — 99214 OFFICE O/P EST MOD 30 MIN: CPT | Mod: 95 | Performed by: FAMILY MEDICINE

## 2023-11-10 ASSESSMENT — PAIN SCALES - GENERAL: PAINLEVEL: NO PAIN (0)

## 2023-11-10 NOTE — LETTER
11/10/2023         RE: Leigh Roberts  [address]  [place] [address]        Dear Colleague,    Thank you for referring your patient, Leigh Roberts, to the [facility] SURGERY CLINIC AND BARIATRICS CARE [place]. Please see a copy of my visit note below.    Virtual Visit Details    Type of service:  Telephone Visit   Phone call duration: 30 minutes   Bariatric Follow-up    28 year old  female BMI:Body mass index is 28.97 kg/m .    Weight:   Wt Readings from Last 1 Encounters:   11/10/23 90.3 kg (199 lb)    pounds    Comorbidities:  Patient Active Problem List   Diagnosis     PCOS (polycystic ovarian syndrome)     Recurrent major depressive disorder, in partial remission (H24)     Anxiety     Morbid obesity (H)     Interim: Maintaining a 76# weight loss. Feels Wegovy affected her eyesight. Continues phentermine. Has been out of Wegovy for 5 weeks. Working now as  in [place]. Currently in [place]. Taking 1/2 tab phentermine.   Happy that she is maintaining her weight loss. Has been working out 3X/wk       Plan:  DIET  try to get 3 meals. Can substitute protein shake for meal if convenient   EXERCISE continue cardio and strength training at the gym 3X/w.  Will be getting steps at work   PHARMACOTHERAPY Continue phentermine  and can take 1 tab. Restart metformin    Discussed weight maintenance and optimizing metabolism. Will continue now on phentermine and metformin and option to restart GLP-1 RA if a covered benefit..     -We reviewed her medications and whether associated with weight gain.    Current Outpatient Medications:      D-5000 125 MCG (5000 UT) tablet, Take 125 mcg by mouth daily, Disp: , Rfl:      escitalopram (LEXAPRO) 10 MG tablet, Take 1 tablet (10 mg) by mouth daily With 5 mg tablet for 15 mg total, Disp: 90 tablet, Rfl: 3     escitalopram (LEXAPRO) 5 MG tablet, Take 1 tablet (5 mg) by mouth daily With 10 mg tablet for 15 mg total., Disp: 90 tablet,  Rfl: 3     Lidocaine (LIDOCARE) 4 % Patch, Place 1-3 patches onto the skin every 24 hours To prevent lidocaine toxicity, patient should be patch free for 12 hrs daily., Disp: 30 patch, Rfl: 0     metFORMIN (GLUCOPHAGE XR) 500 MG 24 hr tablet, Take 1 tablet (500 mg) by mouth 2 times daily (with meals), Disp: 180 tablet, Rfl: 3     methocarbamol (ROBAXIN) 500 MG tablet, Take 1-2 tablets (500-1,000 mg) by mouth 3 times daily as needed for muscle spasms, Disp: 30 tablet, Rfl: 0     ondansetron (ZOFRAN ODT) 4 MG ODT tab, Take 1 tablet (4 mg) by mouth every 8 hours as needed for nausea, Disp: 60 tablet, Rfl: 1     phentermine (ADIPEX-P) 37.5 MG tablet, Take 0.5-1 tablets (18.75-37.5 mg) by mouth every morning (before breakfast) for 180 days, Disp: 90 tablet, Rfl: 1     pimecrolimus (ELIDEL) 1 % external cream, Apply topically 2 times daily, Disp: 30 g, Rfl: 3     Semaglutide-Weight Management (WEGOVY) 1.7 MG/0.75ML pen, Inject 1.7 mg Subcutaneous once a week, Disp: 3 mL, Rfl: 0     Semaglutide-Weight Management (WEGOVY) 2.4 MG/0.75ML pen, Inject 2.4 mg Subcutaneous once a week, Disp: 3 mL, Rfl: 3     valACYclovir (VALTREX) 1000 mg tablet, Take 1,000 mg by mouth daily as needed, Disp: , Rfl:       We discussed [facility] Bariatric Basics including:  -eating 3 meals daily  -eating protein first  -eating slowly, chewing food well  -avoiding/limiting calorie containing beverages  -choosing wheat, not white with breads, crackers, pastas, giovany, bagels, tortillas, rice  -limiting restaurant or cafeteria eating to twice a week or less  -We discussed the importance of restorative sleep and stress management in maintaining a healthy weight.  -We discussed insulin resistance and glycemic index as it relates to appetite and weight control  -We discussed the National Weight Control Registry healthy weight maintenance strategies and ways to optimize metabolism.  -We discussed the importance of physical activity including cardiovascular  "and strength training in maintaining a healthier weight and explored viable options.    Most recent labs:  Lab Results   Component Value Date    WBC 5.9 03/30/2023    HGB 14.9 03/30/2023    HCT 42.9 03/30/2023    MCV 82 03/30/2023     03/30/2023     No results found for: \"CHOL\"  Lab Results   Component Value Date    HDL 43 (L) 03/30/2023       Lab Results   Component Value Date    ALT 29 03/30/2023    AST 30 03/30/2023    ALKPHOS 81 03/30/2023       Lab Results   Component Value Date    TSH 3.23 03/30/2023         DIETARY HISTORY  Meals Per Day: 2  Eating Protein First?: yes  Food Diary: B:breakfast sandwich 1/2 L: S: chips D:sandwich or chicken 7-8 oz airfryer and potatoes salads or corn  Snacks Per Day: premier protein  Typical Snack: see above or chips  Fluid Intake: intentional  Portion Control: improved  Calorie Containing Beverages: no  Alcohol per week: more back home-2-3 on weekends mostly lemonade or bloody evette  Typical Protein Food Choices: lean proteins  Choosing Whole Grains: yes  Grocery Shopping is done by: herself  Food Preparation is done by: herself  Meals at Restaurant/Cafeteria/Take Out Per Week: 0-1  Eating at the Table:   TV is Off During Meals:     Positive Changes Since Last Visit:   Struggling With:     Knowledgeable in Reading Food Labels: yes  Getting Adequate Protein: yes  Sleeping 7-8 hours/day well  Stress management OK    PHYSICAL ACTIVITY PATTERNS:  Cardiovascular: 3X/wk  Strength Training: 3X/wk    REVIEW OF SYSTEMS  PHYSICAL EXAM: (most recent vitals and today's stated weight)  Vitals: Ht 1.765 m (5' 9.5\")   Wt 90.3 kg (199 lb)   BMI 28.97 kg/m        GEN: Pleasant and in no acute distress  PSYCH: A&OX3,     I have reviewed the note as documented above.  This accurately captures the substance of my conversation with the patient.  Thank you for the opportunity to participate in the care of your patient.    Shreya Holguin MD, FAAFP  [facility]  Diplomate, American Board of Obesity Medicine    Total time spent on the date of this encounter doing: chart review, review of test results, patient visit, physical exam, education, counseling, developing plan of care, and documenting = 30 minutes.        Again, thank you for allowing me to participate in the care of your patient.        Sincerely,        Shreya Holguin MD

## 2023-11-10 NOTE — PATIENT INSTRUCTIONS
"WEIGHT MAINTENANCE STRATEGIES    According to the National Weight Control Registry there are several things that people who have lost weight and kept it off have in common. Some of them are...    1. 3 MEALS A DAY:  Make sure you are eating 3 meals each day.  No skipping meals.  80% of people who skip meals are overweight or obese.  Missing meals slows the metabolism, making it harder to maintain a healthy weight.    2. FOOD DIARY:  Much like keeping a ledger for your checkbook, keeping a food and exercise diary helps you \"keep track\" of the balance of energy (calories) in and energy out. It also helps you recognize potential unhealthy deviations from healthy patterns before they become habit. It's a nice way to monitor whether you are getting the protein, fiber, and other nutrients that your body needs.    3. FOLLOW-UP:  Studies show that those who follow up with their health professional regularly maintained their weight loss and those who are \"lost to follow-up \"are at risk for regain.  Moreover, it is essential to monitor vitamin levels with laboratory studies for life following bariatric surgery.     4.  HIGH FIBER/LOW FAT:  Lean sources of protein (skim milk, skinless, baked or broiled chicken breast, fish, etc.)  will help you meet your protein needs while fruits, vegetables, and whole grains will help you get the fiber that your body needs.  This is heart healthy eating and helps to keep calorie levels in balance.    5.  8,000 STEPS PER DAY:  This is a \"weight maintenance dose. \"  It is essential to get your steps in every day, 7 days a week.  You don't have to \"work out \" 7 days a week, but throughout the day, getting 8000 steps will help you maintain the weight you have lost.  Parking far away, taking the stairs instead of the elevator, and pacing while on the phone are some ways to help achieve this goal.    6.  Eat at home 90% OF THE TIME:  People who maintain a healthy weight eat at home, or meal " "prepared at home, 90% of the time.  Studies show that people consume an average of 770 devonte when eating out at a restaurant and 440 devonte when eating a meal prepared at home.  This equates to almost 35 pounds of excess weight for a person who eats out once a day for 1 year.      OTHER HELPFUL HABITS    -Minimize liquid calories.  Skim milk is okay.  -Avoiding \"mindless \"eating, i.e., eating at the TV, and the car, in front of the computer.  -Protect your sleep and Manage your stress        OPTIMIZING YOUR METABOLISM FOR LIFE    1.  MUSCLE MAINTENANCE: Muscle burns calories up to 70% better than fat test.  As we age her body composition changes. We lose muscle mass.  Weight training can help us keep and even build muscle mass.  Dumbbells, pushups, rubber band training, weight machines are all examples of ways to keep and/or build muscle mass.    2.  MOVE: 8000 steps daily has been shown to be a weight maintenance dose.  Aim for 10,000 steps each day. Helpfull habits include taking the stairs instead of the elevator when possible, parking at the far end of the parking lot, pacing while on the phone, and taking the dog for a walk.  Of course using a treadmill, stair climber, elliptical , and bicycle are all ways of getting 10,000 steps.    3.  3 MEALS EACH DAY: Make sure to get your 3 meals each day.  Skipping breakfast, working through your lunch, or not eating dinner will lead to a slowing of your metabolism.  Studies show that 80% of people who skip meals are overweight or obese.    4.  ADEQUATE PROTEIN INTAKE: Getting adequate protein is beneficial for a number of reasons: to aid the healing process, to blunt cravings immediately after eating and for a period of time after eating, to help keep blood sugars level, and to help you maintain your muscle mass.  See #1 above.  "

## 2023-11-10 NOTE — PROGRESS NOTES
Virtual Visit Details    Type of service:  Telephone Visit   Phone call duration: 30 minutes   Bariatric Follow-up    28 year old  female BMI:Body mass index is 28.97 kg/m .    Weight:   Wt Readings from Last 1 Encounters:   11/10/23 90.3 kg (199 lb)    pounds    Comorbidities:  Patient Active Problem List   Diagnosis    PCOS (polycystic ovarian syndrome)    Recurrent major depressive disorder, in partial remission (H24)    Anxiety    Morbid obesity (H)     Interim: Maintaining a 76# weight loss. Feels Wegovy affected her eyesight. Continues phentermine. Has been out of [employer] for 5 weeks. Working now as  in [place]. Currently in [place]. Taking 1/2 tab phentermine.   Happy that she is maintaining her weight loss. Has been working out 3X/wk       Plan:  DIET  try to get 3 meals. Can substitute protein shake for meal if convenient   EXERCISE continue cardio and strength training at the gym 3X/w.  Will be getting steps at work   PHARMACOTHERAPY Continue phentermine  and can take 1 tab. Restart metformin    Discussed weight maintenance and optimizing metabolism. Will continue now on phentermine and metformin and option to restart GLP-1 RA if a covered benefit..     -We reviewed her medications and whether associated with weight gain.    Current Outpatient Medications:     D-5000 125 MCG (5000 UT) tablet, Take 125 mcg by mouth daily, Disp: , Rfl:     escitalopram (LEXAPRO) 10 MG tablet, Take 1 tablet (10 mg) by mouth daily With 5 mg tablet for 15 mg total, Disp: 90 tablet, Rfl: 3    escitalopram (LEXAPRO) 5 MG tablet, Take 1 tablet (5 mg) by mouth daily With 10 mg tablet for 15 mg total., Disp: 90 tablet, Rfl: 3    Lidocaine (LIDOCARE) 4 % Patch, Place 1-3 patches onto the skin every 24 hours To prevent lidocaine toxicity, patient should be patch free for 12 hrs daily., Disp: 30 patch, Rfl: 0    metFORMIN (GLUCOPHAGE XR) 500 MG 24 hr tablet, Take 1 tablet (500 mg) by mouth 2 times daily (with meals),  "Disp: 180 tablet, Rfl: 3    methocarbamol (ROBAXIN) 500 MG tablet, Take 1-2 tablets (500-1,000 mg) by mouth 3 times daily as needed for muscle spasms, Disp: 30 tablet, Rfl: 0    ondansetron (ZOFRAN ODT) 4 MG ODT tab, Take 1 tablet (4 mg) by mouth every 8 hours as needed for nausea, Disp: 60 tablet, Rfl: 1    phentermine (ADIPEX-P) 37.5 MG tablet, Take 0.5-1 tablets (18.75-37.5 mg) by mouth every morning (before breakfast) for 180 days, Disp: 90 tablet, Rfl: 1    pimecrolimus (ELIDEL) 1 % external cream, Apply topically 2 times daily, Disp: 30 g, Rfl: 3    Semaglutide-Weight Management (WEGOVY) 1.7 MG/0.75ML pen, Inject 1.7 mg Subcutaneous once a week, Disp: 3 mL, Rfl: 0    Semaglutide-Weight Management (WEGOVY) 2.4 MG/0.75ML pen, Inject 2.4 mg Subcutaneous once a week, Disp: 3 mL, Rfl: 3    valACYclovir (VALTREX) 1000 mg tablet, Take 1,000 mg by mouth daily as needed, Disp: , Rfl:       We discussed [facility] Bariatric Basics including:  -eating 3 meals daily  -eating protein first  -eating slowly, chewing food well  -avoiding/limiting calorie containing beverages  -choosing wheat, not white with breads, crackers, pastas, giovany, bagels, tortillas, rice  -limiting restaurant or cafeteria eating to twice a week or less  -We discussed the importance of restorative sleep and stress management in maintaining a healthy weight.  -We discussed insulin resistance and glycemic index as it relates to appetite and weight control  -We discussed the National Weight Control Registry healthy weight maintenance strategies and ways to optimize metabolism.  -We discussed the importance of physical activity including cardiovascular and strength training in maintaining a healthier weight and explored viable options.    Most recent labs:  Lab Results   Component Value Date    WBC 5.9 03/30/2023    HGB 14.9 03/30/2023    HCT 42.9 03/30/2023    MCV 82 03/30/2023     03/30/2023     No results found for: \"CHOL\"  Lab Results " "  Component Value Date    HDL 43 (L) 03/30/2023       Lab Results   Component Value Date    ALT 29 03/30/2023    AST 30 03/30/2023    ALKPHOS 81 03/30/2023       Lab Results   Component Value Date    TSH 3.23 03/30/2023         DIETARY HISTORY  Meals Per Day: 2  Eating Protein First?: yes  Food Diary: B:breakfast sandwich 1/2 L: S: chips D:sandwich or chicken 7-8 oz airfryer and potatoes salads or corn  Snacks Per Day: premier protein  Typical Snack: see above or chips  Fluid Intake: intentional  Portion Control: improved  Calorie Containing Beverages: no  Alcohol per week: more back home-2-3 on weekends mostly lemonade or bloody evette  Typical Protein Food Choices: lean proteins  Choosing Whole Grains: yes  Grocery Shopping is done by: herself  Food Preparation is done by: herself  Meals at Restaurant/Cafeteria/Take Out Per Week: 0-1  Eating at the Table:   TV is Off During Meals:     Positive Changes Since Last Visit:   Struggling With:     Knowledgeable in Reading Food Labels: yes  Getting Adequate Protein: yes  Sleeping 7-8 hours/day well  Stress management OK    PHYSICAL ACTIVITY PATTERNS:  Cardiovascular: 3X/wk  Strength Training: 3X/wk    REVIEW OF SYSTEMS  PHYSICAL EXAM: (most recent vitals and today's stated weight)  Vitals: Ht 1.765 m (5' 9.5\")   Wt 90.3 kg (199 lb)   BMI 28.97 kg/m        GEN: Pleasant and in no acute distress  PSYCH: A&OX3,     I have reviewed the note as documented above.  This accurately captures the substance of my conversation with the patient.  Thank you for the opportunity to participate in the care of your patient.    Shreya Holguin MD, FAAFP  [facility]  Diplomate, American Board of Obesity Medicine    Total time spent on the date of this encounter doing: chart review, review of test results, patient visit, physical exam, education, counseling, developing plan of care, and documenting = 30 minutes.      "

## 2023-11-10 NOTE — NURSING NOTE
Is the patient currently in the state of [place]? YES    Visit mode:TELEPHONE    If the visit is dropped, the patient can be reconnected by: TELEPHONE VISIT: Phone number:   Telephone Information:   Mobile [phone]       Will anyone else be joining the visit? NO  (If patient encounters technical issues they should call [phone]    How would you like to obtain your AVS? MyChart    Are changes needed to the allergy or medication list? No    Reason for visit: RECHECK    Bipin MONTE

## 2023-11-13 ENCOUNTER — MYC MEDICAL ADVICE (OUTPATIENT)
Dept: SURGERY | Facility: CLINIC | Age: 28
End: 2023-11-13

## 2023-11-13 DIAGNOSIS — E66.01 MORBID OBESITY (H): ICD-10-CM

## 2023-11-21 DIAGNOSIS — E66.9 OBESITY (BMI 30-39.9): Primary | ICD-10-CM

## 2023-11-21 RX ORDER — SEMAGLUTIDE 0.25 MG/.5ML
0.25 INJECTION, SOLUTION SUBCUTANEOUS WEEKLY
Qty: 2 ML | Refills: 0 | Status: CANCELLED | OUTPATIENT
Start: 2023-11-21 | End: 2023-12-19

## 2023-11-22 ENCOUNTER — TELEPHONE (OUTPATIENT)
Dept: SURGERY | Facility: CLINIC | Age: 28
End: 2023-11-22

## 2023-11-22 RX ORDER — SEMAGLUTIDE 0.25 MG/.5ML
0.25 INJECTION, SOLUTION SUBCUTANEOUS WEEKLY
Qty: 2 ML | Refills: 0 | Status: SHIPPED | OUTPATIENT
Start: 2023-11-22 | End: 2023-11-22

## 2023-11-22 RX ORDER — SEMAGLUTIDE 0.5 MG/.5ML
0.5 INJECTION, SOLUTION SUBCUTANEOUS WEEKLY
Qty: 2 ML | Refills: 0 | Status: SHIPPED | OUTPATIENT
Start: 2023-12-20

## 2023-11-22 RX ORDER — SEMAGLUTIDE 1 MG/.5ML
1 INJECTION, SOLUTION SUBCUTANEOUS WEEKLY
Qty: 2 ML | Refills: 0 | Status: SHIPPED | OUTPATIENT
Start: 2024-01-17 | End: 2023-11-22

## 2023-11-22 RX ORDER — SEMAGLUTIDE 0.5 MG/.5ML
0.5 INJECTION, SOLUTION SUBCUTANEOUS WEEKLY
Qty: 2 ML | Refills: 0 | Status: SHIPPED | OUTPATIENT
Start: 2023-12-20 | End: 2023-11-22

## 2023-11-22 RX ORDER — SEMAGLUTIDE 1 MG/.5ML
1 INJECTION, SOLUTION SUBCUTANEOUS WEEKLY
Qty: 2 ML | Refills: 0 | Status: SHIPPED | OUTPATIENT
Start: 2024-01-17

## 2023-11-22 RX ORDER — SEMAGLUTIDE 0.25 MG/.5ML
0.25 INJECTION, SOLUTION SUBCUTANEOUS WEEKLY
Qty: 2 ML | Refills: 0 | Status: SHIPPED | OUTPATIENT
Start: 2023-11-22

## 2023-11-22 NOTE — TELEPHONE ENCOUNTER
Central Prior Authorization Team   Phone: [phone]    PRIOR AUTHORIZATION DENIED    Medication: WEGOVY 0.25 MG/0.5ML SC SOAJ  Insurance Company: Comment:  [employer]  Denial Date: 11/22/2023  Denial Rational: PLAN EXCLUSION      Appeal Information: IF PROVIDER WOULD LIKE TO APPEAL THIS DECISION PLEASE PROVIDE THE PA TEAM WITH A LETTER OF MEDICAL NECESSITY      Patient Notified: No

## 2024-01-15 ENCOUNTER — TELEPHONE (OUTPATIENT)
Dept: FAMILY MEDICINE | Facility: CLINIC | Age: 29
End: 2024-01-15

## 2024-01-15 NOTE — TELEPHONE ENCOUNTER
Patient Quality Outreach    Patient is due for the following:   Cervical Cancer Screening - PAP Needed  Depression  -  PHQ-9 needed and DAP    Next Steps:   Schedule a Adult Preventative    Type of outreach:    Sent [employer] message.      Questions for provider review:    None           Riddhi Etienne, CMA

## 2024-09-22 ENCOUNTER — HEALTH MAINTENANCE LETTER (OUTPATIENT)
Age: 29
End: 2024-09-22